# Patient Record
Sex: MALE | Race: WHITE | Employment: FULL TIME | URBAN - METROPOLITAN AREA
[De-identification: names, ages, dates, MRNs, and addresses within clinical notes are randomized per-mention and may not be internally consistent; named-entity substitution may affect disease eponyms.]

---

## 2021-11-11 ENCOUNTER — TRANSCRIBE ORDER (OUTPATIENT)
Dept: SCHEDULING | Age: 56
End: 2021-11-11

## 2021-11-11 DIAGNOSIS — M16.7: Primary | ICD-10-CM

## 2021-11-12 ENCOUNTER — TRANSCRIBE ORDER (OUTPATIENT)
Dept: SCHEDULING | Age: 56
End: 2021-11-12

## 2021-11-29 ENCOUNTER — TRANSCRIBE ORDER (OUTPATIENT)
Dept: REGISTRATION | Age: 56
End: 2021-11-29

## 2021-11-29 DIAGNOSIS — Z01.812 PRE-PROCEDURE LAB EXAM: Primary | ICD-10-CM

## 2021-12-03 ENCOUNTER — HOSPITAL ENCOUNTER (OUTPATIENT)
Dept: CT IMAGING | Age: 56
Discharge: HOME | End: 2021-12-03
Attending: ORTHOPAEDIC SURGERY
Payer: COMMERCIAL

## 2021-12-03 DIAGNOSIS — M16.7: ICD-10-CM

## 2021-12-03 PROCEDURE — 73700 CT LOWER EXTREMITY W/O DYE: CPT

## 2021-12-06 ENCOUNTER — HOSPITAL ENCOUNTER (OUTPATIENT)
Dept: PREADMISSION TESTING | Age: 56
Discharge: HOME | End: 2021-12-06
Payer: COMMERCIAL

## 2021-12-06 VITALS
HEIGHT: 72 IN | TEMPERATURE: 97.8 F | HEART RATE: 78 BPM | WEIGHT: 264.55 LBS | BODY MASS INDEX: 35.83 KG/M2 | OXYGEN SATURATION: 93 %

## 2021-12-06 LAB
ABO + RH BLD: NORMAL
APPEARANCE UR: CLEAR
BACTERIA URNS QL MICRO: NEGATIVE /HPF
BILIRUB UR QL: NEGATIVE
BLOOD GROUP ANTIBODIES SERPL: NORMAL
COLOR UR: NORMAL
EPITH CASTS URNS QL MICRO: NORMAL /LPF
GLUCOSE UR STRIP.AUTO-MCNC: NEGATIVE MG/DL
HGB UR QL STRIP: NEGATIVE
HYALINE CASTS URNS QL MICRO: NORMAL /LPF (ref 0–5)
KETONES UR QL STRIP.AUTO: NEGATIVE MG/DL
LEUKOCYTE ESTERASE UR QL STRIP.AUTO: NEGATIVE
NITRITE UR QL STRIP.AUTO: NEGATIVE
PH UR STRIP: 6 [PH] (ref 5–8)
PROT UR STRIP-MCNC: NEGATIVE MG/DL
RBC #/AREA URNS HPF: NORMAL /HPF (ref 0–5)
SP GR UR REFRACTOMETRY: 1.02 (ref 1–1.03)
SPECIMEN EXP DATE BLD: NORMAL
UA: UC IF INDICATED,UAUC: NORMAL
UROBILINOGEN UR QL STRIP.AUTO: 0.2 EU/DL (ref 0.2–1)
WBC URNS QL MICRO: NORMAL /HPF (ref 0–4)

## 2021-12-06 PROCEDURE — 81001 URINALYSIS AUTO W/SCOPE: CPT

## 2021-12-06 PROCEDURE — 86901 BLOOD TYPING SEROLOGIC RH(D): CPT

## 2021-12-06 RX ORDER — IBUPROFEN 200 MG
200 TABLET ORAL
COMMUNITY
End: 2021-12-17

## 2021-12-06 RX ORDER — METHOTREXATE 2.5 MG/1
2.5 TABLET ORAL
COMMUNITY

## 2021-12-06 RX ORDER — CETIRIZINE HYDROCHLORIDE 10 MG/1
10 CAPSULE, LIQUID FILLED ORAL DAILY
COMMUNITY

## 2021-12-06 RX ORDER — LEUCOVORIN CALCIUM 5 MG/1
TABLET ORAL
COMMUNITY
End: 2021-12-18 | Stop reason: DRUGHIGH

## 2021-12-06 RX ORDER — DEXTROAMPHETAMINE SACCHARATE, AMPHETAMINE ASPARTATE, DEXTROAMPHETAMINE SULFATE AND AMPHETAMINE SULFATE 5; 5; 5; 5 MG/1; MG/1; MG/1; MG/1
20 TABLET ORAL DAILY
COMMUNITY

## 2021-12-06 RX ORDER — ACETAMINOPHEN 325 MG/1
325 TABLET ORAL
COMMUNITY

## 2021-12-06 NOTE — PERIOP NOTES
Preoperative instructions reviewed with patient. Patient given two bottles of CHG soap. Instructions(reviewed/to be reviewed in class) on use of CHG soap. Patient given SSI infection FAQS sheet, as well as a  MRSA/MSSA treatment instruction sheet  With an explanation to patient that they will be notified if treatment instructions need to be initiated. Patient was given the opportunity to ask questions on the information provided. PROOF OF COVID VACCINE ON CHART. BP IN RIGHT ARM: 160/99 X2             168/110  BP IN LEFT ARM DONE MANUALLY: 158/99    PT HAD NO C/O OF HEADACHE, CHEST PAIN, OR SOB. Sondra De Jesus NP IN TO ASSESS AND INSTRUCT PT ABOUT CALLING PCP IF BP DOES NOT DECREASE ONCE AT HOME. PT DOES HAVE AN AUTOMATIC BP CUFF AND WILL CHECK BP AT HOME.

## 2021-12-06 NOTE — PERIOP NOTES
PAT Nurse Practitioner   Pre-Operative Chart Review/Assessment:-ORTHOPEDIC                Patient Name:  Soni Plascencia                                                           Age:   49 y.o.    :  1965     Today's Date:  2021     Date of PAT:   2021      Date of Surgery:    2021      Procedure(s):  Right Total Hip Arthroplasty FAST TRACK     Surgeon:   Dr. Philippe Mckay                       PLAN:      1)  Medical Clearance:  Dr. Yinka Brown      2)  Cardiac Clearance:  EKG from PCP on 21 and METs reviewed. No further cardiac evaluation requested. 3)  Diabetic Treatment Consult:  Not indicated. A1c-5.4      4)  Sleep Apnea evaluation:   VANDANA score of 6. Pt reports loud snoring that can be heard through a closed door and a diagnosis of HTN. Pt denies daytime fatigue and witnessed pauses in breathing. Pt refuses referral to sleep medicine at this time. 5) Treatment for MRSA/Staph Aureus:  Neg      6) Additional Concerns:  Former smoker, HTN, Psoriatic arthritis    Pt seen for elevated BP during PAT visit. /99. Other VSS. No c/o HA, CP, dizziness. Pt is on BP meds(see below) which he took this am. He does report that he is anxious and having some pain in his hip. He also stated that his BP was 120's/70's at his PCP office last week. He has a cuff at home and monitors his BP regularly. Pt instructed to recheck BP later today and if SBP>160 or DBP>100, call and notify his PCP. Also discussed S&S of stroke and heart attack and if he experiences any of these, seek emergency medical attention. Pt agrees to plan of care.               Vital Signs:         Vitals:    21 0819   Pulse: 78   Temp: 97.8 °F (36.6 °C)   SpO2: 93%   Weight: 120 kg (264 lb 8.8 oz)   Height: 6' (1.829 m)            ____________________________________________  PAST MEDICAL HISTORY  Past Medical History:   Diagnosis Date    High cholesterol     Hypertension     Psoriasis     Psoriatic arthritis ([facility]       ____________________________________________  PAST SURGICAL HISTORY  Past Surgical History:   Procedure Laterality Date    HX HERNIA REPAIR  2006    HX TONSILLECTOMY      HX TOTAL COLECTOMY  2001    HX VASECTOMY        ____________________________________________  HOME MEDICATIONS  Current Outpatient Medications   Medication Sig    methotrexate (RHEUMATREX) 2.5 mg tablet Take 2.5 mg by mouth every Wednesday. Indications: seven tablets every 7 days    dextroamphetamine-amphetamine (AdderalL) 20 mg tablet Take 20 mg by mouth.  Cetirizine (ZyrTEC) 10 mg cap Take 10 mg by mouth.  leucovorin calcium (WELLCOVORIN) 5 mg tablet Take  by mouth.  ibuprofen (AdviL) 200 mg tablet Take 200 mg by mouth.  acetaminophen (TylenoL) 325 mg tablet Take 325 mg by mouth every four (4) hours as needed for Pain.  hydrochlorothiazide (HYDRODIURIL) 25 mg tablet Take 25 mg by mouth daily.  LOSARTAN PO Take 100 mg by mouth daily.  adalimumab (HUMIRA PEN) 40 mg/0.8 mL PnKt 40 mg by SubCUTAneous route every fourteen (14) days.  SIMVASTATIN PO Take 1 Tab by mouth nightly. No current facility-administered medications for this encounter.      ____________________________________________  ALLERGIES  No Known Allergies   ____________________________________________  SOCIAL HISTORY  Social History     Tobacco Use    Smoking status: Former Smoker     Packs/day: 0.50     Years: 10.00     Pack years: 5.00    Smokeless tobacco: Current User   Substance Use Topics    Alcohol use:  Yes     Alcohol/week: 6.0 standard drinks     Types: 6 Cans of beer per week      ____________________________________________   Internal Administration   First Dose COVID-19, PFIZER, MRNA, LNP-S, PF, 30MCG/0.3ML DOSE  03/31/2021   Second Dose COVID-19, PFIZER, MRNA, LNP-S, PF, 30MCG/0.3ML DOSE  04/29/2021      Last COVID Lab SARS-CoV-2 ( )   Date Value   12/13/2021 Not detected        Labs:     Hospital Outpatient Visit on 12/06/2021   Component Date Value Ref Range Status    Crossmatch Expiration 12/06/2021 12/19/2021,2359   Final    ABO/Rh(D) 12/06/2021 O POSITIVE   Final    Antibody screen 12/06/2021 NEG   Final    Color 12/06/2021 YELLOW/STRAW    Final    Color Reference Range: Straw, Yellow or Dark Yellow    Appearance 12/06/2021 CLEAR  CLEAR   Final    Specific gravity 12/06/2021 1.018  1.003 - 1.030   Final    pH (UA) 12/06/2021 6.0  5.0 - 8.0   Final    Protein 12/06/2021 Negative  NEG mg/dL Final    Glucose 12/06/2021 Negative  NEG mg/dL Final    Ketone 12/06/2021 Negative  NEG mg/dL Final    Bilirubin 12/06/2021 Negative  NEG   Final    Blood 12/06/2021 Negative  NEG   Final    Urobilinogen 12/06/2021 0.2  0.2 - 1.0 EU/dL Final    Nitrites 12/06/2021 Negative  NEG   Final    Leukocyte Esterase 12/06/2021 Negative  NEG   Final    UA:UC IF INDICATED 12/06/2021 CULTURE NOT INDICATED BY UA RESULT    Final    WBC 12/06/2021 0-4  0 - 4 /hpf Final    RBC 12/06/2021 0-5  0 - 5 /hpf Final    Epithelial cells 12/06/2021 FEW  FEW /lpf Final    Epithelial cell category consists of squamous cells and /or transitional urothelial cells. Renal tubular cells, if present, are separately identified as such.  Bacteria 12/06/2021 Negative  NEG /hpf Final    Hyaline cast 12/06/2021 0-2  0 - 5 /lpf Final    Special Requests: 12/06/2021 NO SPECIAL REQUESTS    Final    Culture result: 12/06/2021 MRSA NOT PRESENT    Final       Skin:     Denies open wounds, cuts, sores, rashes or other areas of concern in PAT assessment.           Jayshree Mckeon NP

## 2021-12-07 LAB
BACTERIA SPEC CULT: NORMAL
BACTERIA SPEC CULT: NORMAL
SERVICE CMNT-IMP: NORMAL

## 2021-12-13 ENCOUNTER — HOSPITAL ENCOUNTER (OUTPATIENT)
Dept: PREADMISSION TESTING | Age: 56
Discharge: HOME | End: 2021-12-13
Attending: ORTHOPAEDIC SURGERY
Payer: COMMERCIAL

## 2021-12-13 DIAGNOSIS — Z01.812 PRE-PROCEDURE LAB EXAM: ICD-10-CM

## 2021-12-13 PROCEDURE — U0005 INFEC AGEN DETEC AMPLI PROBE: HCPCS

## 2021-12-14 LAB
SARS-COV-2, XPLCVT: NOT DETECTED
SOURCE, COVRS: NORMAL

## 2021-12-15 ENCOUNTER — ANESTHESIA EVENT (OUTPATIENT)
Dept: SURGERY | Age: 56
End: 2021-12-15
Payer: COMMERCIAL

## 2021-12-16 ENCOUNTER — APPOINTMENT (OUTPATIENT)
Dept: GENERAL RADIOLOGY | Age: 56
End: 2021-12-16
Attending: ORTHOPAEDIC SURGERY
Payer: COMMERCIAL

## 2021-12-16 ENCOUNTER — HOSPITAL ENCOUNTER (OUTPATIENT)
Age: 56
Setting detail: OBSERVATION
Discharge: HOME | End: 2021-12-17
Attending: ORTHOPAEDIC SURGERY | Admitting: ORTHOPAEDIC SURGERY
Payer: COMMERCIAL

## 2021-12-16 ENCOUNTER — ANESTHESIA (OUTPATIENT)
Dept: SURGERY | Age: 56
End: 2021-12-16
Payer: COMMERCIAL

## 2021-12-16 DIAGNOSIS — Z96.641 STATUS POST TOTAL HIP REPLACEMENT, RIGHT: ICD-10-CM

## 2021-12-16 DIAGNOSIS — M16.11 PRIMARY OSTEOARTHRITIS OF RIGHT HIP: Primary | ICD-10-CM

## 2021-12-16 LAB
GLUCOSE BLD STRIP.AUTO-MCNC: 107 MG/DL (ref 65–117)
SERVICE CMNT-IMP: NORMAL

## 2021-12-16 PROCEDURE — 77030040361 HC SLV COMPR DVT MDII -B: Performed by: ORTHOPAEDIC SURGERY

## 2021-12-16 PROCEDURE — 77030026438 HC STYL ET INTUB CARD -A: Performed by: ANESTHESIOLOGY

## 2021-12-16 PROCEDURE — 73501 X-RAY EXAM HIP UNI 1 VIEW: CPT

## 2021-12-16 PROCEDURE — 82962 GLUCOSE BLOOD TEST: CPT

## 2021-12-16 PROCEDURE — 76210000016 HC OR PH I REC 1 TO 1.5 HR: Performed by: ORTHOPAEDIC SURGERY

## 2021-12-16 PROCEDURE — 2709999900 HC NON-CHARGEABLE SUPPLY: Performed by: ORTHOPAEDIC SURGERY

## 2021-12-16 PROCEDURE — 77030003666 HC NDL SPINAL BD -A: Performed by: ORTHOPAEDIC SURGERY

## 2021-12-16 PROCEDURE — 77030013079 HC BLNKT BAIR HGGR 3M -A: Performed by: ANESTHESIOLOGY

## 2021-12-16 PROCEDURE — 74011000250 HC RX REV CODE- 250: Performed by: NURSE ANESTHETIST, CERTIFIED REGISTERED

## 2021-12-16 PROCEDURE — 77030008684 HC TU ET CUF COVD -B: Performed by: ANESTHESIOLOGY

## 2021-12-16 PROCEDURE — 74011250636 HC RX REV CODE- 250/636: Performed by: NURSE ANESTHETIST, CERTIFIED REGISTERED

## 2021-12-16 PROCEDURE — 77030018547 HC SUT ETHBND1 J&J -B: Performed by: ORTHOPAEDIC SURGERY

## 2021-12-16 PROCEDURE — G0378 HOSPITAL OBSERVATION PER HR: HCPCS

## 2021-12-16 PROCEDURE — 77030020788: Performed by: ORTHOPAEDIC SURGERY

## 2021-12-16 PROCEDURE — 74011250636 HC RX REV CODE- 250/636: Performed by: ORTHOPAEDIC SURGERY

## 2021-12-16 PROCEDURE — C1776 JOINT DEVICE (IMPLANTABLE): HCPCS | Performed by: ORTHOPAEDIC SURGERY

## 2021-12-16 PROCEDURE — 74011250636 HC RX REV CODE- 250/636: Performed by: ANESTHESIOLOGY

## 2021-12-16 PROCEDURE — 77030006835 HC BLD SAW SAG STRY -B: Performed by: ORTHOPAEDIC SURGERY

## 2021-12-16 PROCEDURE — 77030035236 HC SUT PDS STRATFX BARB J&J -B: Performed by: ORTHOPAEDIC SURGERY

## 2021-12-16 PROCEDURE — 76060000039 HC ANESTHESIA 4 TO 4.5 HR: Performed by: ORTHOPAEDIC SURGERY

## 2021-12-16 PROCEDURE — 77030040922 HC BLNKT HYPOTHRM STRY -A

## 2021-12-16 PROCEDURE — 77030031139 HC SUT VCRL2 J&J -A: Performed by: ORTHOPAEDIC SURGERY

## 2021-12-16 PROCEDURE — 74011250637 HC RX REV CODE- 250/637: Performed by: ORTHOPAEDIC SURGERY

## 2021-12-16 PROCEDURE — 74011000250 HC RX REV CODE- 250: Performed by: ORTHOPAEDIC SURGERY

## 2021-12-16 PROCEDURE — 76010000173 HC OR TIME 3 TO 3.5 HR INTENSV-TIER 1: Performed by: ORTHOPAEDIC SURGERY

## 2021-12-16 DEVICE — CERAMIC V40 FEMORAL HEAD
Type: IMPLANTABLE DEVICE | Site: HIP | Status: FUNCTIONAL
Brand: BIOLOX

## 2021-12-16 DEVICE — 132 DEGREE NECK ANGLE HIP STEM
Type: IMPLANTABLE DEVICE | Site: HIP | Status: FUNCTIONAL
Brand: ACCOLADE

## 2021-12-16 DEVICE — TRIDENT II TRITANIUM CLUSTER 56F
Type: IMPLANTABLE DEVICE | Site: HIP | Status: FUNCTIONAL
Brand: TRIDENT II

## 2021-12-16 DEVICE — HIP H2 TOT ADV OTHER HD -- IMPL CAPPED H2: Type: IMPLANTABLE DEVICE | Site: HIP | Status: FUNCTIONAL

## 2021-12-16 DEVICE — TRIDENT X3 0 DEGREE POLYETHYLENE INSERT
Type: IMPLANTABLE DEVICE | Site: HIP | Status: FUNCTIONAL
Brand: TRIDENT X3 INSERT

## 2021-12-16 RX ORDER — SODIUM CHLORIDE 0.9 % (FLUSH) 0.9 %
5-40 SYRINGE (ML) INJECTION AS NEEDED
Status: DISCONTINUED | OUTPATIENT
Start: 2021-12-16 | End: 2021-12-16 | Stop reason: HOSPADM

## 2021-12-16 RX ORDER — ALPRAZOLAM 0.5 MG/1
0.5 TABLET ORAL
Status: DISCONTINUED | OUTPATIENT
Start: 2021-12-16 | End: 2021-12-17 | Stop reason: HOSPADM

## 2021-12-16 RX ORDER — SUCCINYLCHOLINE CHLORIDE 20 MG/ML
INJECTION INTRAMUSCULAR; INTRAVENOUS AS NEEDED
Status: DISCONTINUED | OUTPATIENT
Start: 2021-12-16 | End: 2021-12-16 | Stop reason: HOSPADM

## 2021-12-16 RX ORDER — HYDROMORPHONE HYDROCHLORIDE 2 MG/ML
INJECTION, SOLUTION INTRAMUSCULAR; INTRAVENOUS; SUBCUTANEOUS AS NEEDED
Status: DISCONTINUED | OUTPATIENT
Start: 2021-12-16 | End: 2021-12-16 | Stop reason: HOSPADM

## 2021-12-16 RX ORDER — FENTANYL CITRATE 50 UG/ML
INJECTION, SOLUTION INTRAMUSCULAR; INTRAVENOUS AS NEEDED
Status: DISCONTINUED | OUTPATIENT
Start: 2021-12-16 | End: 2021-12-16 | Stop reason: HOSPADM

## 2021-12-16 RX ORDER — ASPIRIN 81 MG/1
81 TABLET ORAL 2 TIMES DAILY
Status: DISCONTINUED | OUTPATIENT
Start: 2021-12-16 | End: 2021-12-17 | Stop reason: HOSPADM

## 2021-12-16 RX ORDER — SODIUM CHLORIDE 9 MG/ML
125 INJECTION, SOLUTION INTRAVENOUS CONTINUOUS
Status: DISPENSED | OUTPATIENT
Start: 2021-12-16 | End: 2021-12-17

## 2021-12-16 RX ORDER — DIPHENHYDRAMINE HCL 25 MG
25 CAPSULE ORAL
Status: DISCONTINUED | OUTPATIENT
Start: 2021-12-16 | End: 2021-12-17 | Stop reason: HOSPADM

## 2021-12-16 RX ORDER — LIDOCAINE HYDROCHLORIDE 10 MG/ML
0.1 INJECTION, SOLUTION EPIDURAL; INFILTRATION; INTRACAUDAL; PERINEURAL AS NEEDED
Status: DISCONTINUED | OUTPATIENT
Start: 2021-12-16 | End: 2021-12-16 | Stop reason: HOSPADM

## 2021-12-16 RX ORDER — KETOROLAC TROMETHAMINE 30 MG/ML
15 INJECTION, SOLUTION INTRAMUSCULAR; INTRAVENOUS EVERY 6 HOURS
Status: DISCONTINUED | OUTPATIENT
Start: 2021-12-16 | End: 2021-12-16

## 2021-12-16 RX ORDER — FENTANYL CITRATE 50 UG/ML
25 INJECTION, SOLUTION INTRAMUSCULAR; INTRAVENOUS
Status: DISCONTINUED | OUTPATIENT
Start: 2021-12-16 | End: 2021-12-16 | Stop reason: HOSPADM

## 2021-12-16 RX ORDER — PROPOFOL 10 MG/ML
INJECTION, EMULSION INTRAVENOUS AS NEEDED
Status: DISCONTINUED | OUTPATIENT
Start: 2021-12-16 | End: 2021-12-16 | Stop reason: HOSPADM

## 2021-12-16 RX ORDER — SODIUM CHLORIDE 0.9 % (FLUSH) 0.9 %
5-40 SYRINGE (ML) INJECTION EVERY 8 HOURS
Status: DISCONTINUED | OUTPATIENT
Start: 2021-12-16 | End: 2021-12-17 | Stop reason: HOSPADM

## 2021-12-16 RX ORDER — ONDANSETRON 2 MG/ML
4 INJECTION INTRAMUSCULAR; INTRAVENOUS AS NEEDED
Status: DISCONTINUED | OUTPATIENT
Start: 2021-12-16 | End: 2021-12-16 | Stop reason: HOSPADM

## 2021-12-16 RX ORDER — HYDROCHLOROTHIAZIDE 25 MG/1
25 TABLET ORAL DAILY
Status: DISCONTINUED | OUTPATIENT
Start: 2021-12-17 | End: 2021-12-17 | Stop reason: HOSPADM

## 2021-12-16 RX ORDER — MIDAZOLAM HYDROCHLORIDE 1 MG/ML
INJECTION, SOLUTION INTRAMUSCULAR; INTRAVENOUS AS NEEDED
Status: DISCONTINUED | OUTPATIENT
Start: 2021-12-16 | End: 2021-12-16 | Stop reason: HOSPADM

## 2021-12-16 RX ORDER — DEXAMETHASONE SODIUM PHOSPHATE 4 MG/ML
INJECTION, SOLUTION INTRA-ARTICULAR; INTRALESIONAL; INTRAMUSCULAR; INTRAVENOUS; SOFT TISSUE AS NEEDED
Status: DISCONTINUED | OUTPATIENT
Start: 2021-12-16 | End: 2021-12-16 | Stop reason: HOSPADM

## 2021-12-16 RX ORDER — KETOROLAC TROMETHAMINE 30 MG/ML
15 INJECTION, SOLUTION INTRAMUSCULAR; INTRAVENOUS EVERY 6 HOURS
Status: COMPLETED | OUTPATIENT
Start: 2021-12-16 | End: 2021-12-17

## 2021-12-16 RX ORDER — SODIUM CHLORIDE 0.9 % (FLUSH) 0.9 %
5-40 SYRINGE (ML) INJECTION AS NEEDED
Status: DISCONTINUED | OUTPATIENT
Start: 2021-12-16 | End: 2021-12-17 | Stop reason: HOSPADM

## 2021-12-16 RX ORDER — METHOTREXATE 2.5 MG/1
2.5 TABLET ORAL
Status: DISCONTINUED | OUTPATIENT
Start: 2021-12-22 | End: 2021-12-17 | Stop reason: HOSPADM

## 2021-12-16 RX ORDER — SODIUM CHLORIDE 0.9 % (FLUSH) 0.9 %
5-40 SYRINGE (ML) INJECTION EVERY 8 HOURS
Status: DISCONTINUED | OUTPATIENT
Start: 2021-12-16 | End: 2021-12-16 | Stop reason: HOSPADM

## 2021-12-16 RX ORDER — SODIUM CHLORIDE, SODIUM LACTATE, POTASSIUM CHLORIDE, CALCIUM CHLORIDE 600; 310; 30; 20 MG/100ML; MG/100ML; MG/100ML; MG/100ML
50 INJECTION, SOLUTION INTRAVENOUS CONTINUOUS
Status: DISCONTINUED | OUTPATIENT
Start: 2021-12-16 | End: 2021-12-16 | Stop reason: HOSPADM

## 2021-12-16 RX ORDER — ROCURONIUM BROMIDE 10 MG/ML
INJECTION, SOLUTION INTRAVENOUS AS NEEDED
Status: DISCONTINUED | OUTPATIENT
Start: 2021-12-16 | End: 2021-12-16 | Stop reason: HOSPADM

## 2021-12-16 RX ORDER — HYDROMORPHONE HYDROCHLORIDE 1 MG/ML
0.2 INJECTION, SOLUTION INTRAMUSCULAR; INTRAVENOUS; SUBCUTANEOUS
Status: DISCONTINUED | OUTPATIENT
Start: 2021-12-16 | End: 2021-12-16 | Stop reason: HOSPADM

## 2021-12-16 RX ORDER — AMOXICILLIN 250 MG
1 CAPSULE ORAL 2 TIMES DAILY
Status: DISCONTINUED | OUTPATIENT
Start: 2021-12-16 | End: 2021-12-17 | Stop reason: HOSPADM

## 2021-12-16 RX ORDER — LOSARTAN POTASSIUM 50 MG/1
100 TABLET ORAL DAILY
Status: DISCONTINUED | OUTPATIENT
Start: 2021-12-17 | End: 2021-12-17 | Stop reason: HOSPADM

## 2021-12-16 RX ORDER — ONDANSETRON 2 MG/ML
INJECTION INTRAMUSCULAR; INTRAVENOUS AS NEEDED
Status: DISCONTINUED | OUTPATIENT
Start: 2021-12-16 | End: 2021-12-16 | Stop reason: HOSPADM

## 2021-12-16 RX ORDER — OXYCODONE HYDROCHLORIDE 5 MG/1
5 TABLET ORAL
Status: DISCONTINUED | OUTPATIENT
Start: 2021-12-16 | End: 2021-12-17 | Stop reason: HOSPADM

## 2021-12-16 RX ORDER — OXYCODONE HYDROCHLORIDE 5 MG/1
10 TABLET ORAL
Status: DISCONTINUED | OUTPATIENT
Start: 2021-12-16 | End: 2021-12-17 | Stop reason: HOSPADM

## 2021-12-16 RX ORDER — TRANEXAMIC ACID 100 MG/ML
INJECTION, SOLUTION INTRAVENOUS AS NEEDED
Status: DISCONTINUED | OUTPATIENT
Start: 2021-12-16 | End: 2021-12-16 | Stop reason: HOSPADM

## 2021-12-16 RX ORDER — ACETAMINOPHEN 325 MG/1
650 TABLET ORAL
Status: DISCONTINUED | OUTPATIENT
Start: 2021-12-16 | End: 2021-12-17 | Stop reason: HOSPADM

## 2021-12-16 RX ORDER — FACIAL-BODY WIPES
10 EACH TOPICAL DAILY PRN
Status: DISCONTINUED | OUTPATIENT
Start: 2021-12-18 | End: 2021-12-17 | Stop reason: HOSPADM

## 2021-12-16 RX ORDER — DEXTROAMPHETAMINE SACCHARATE, AMPHETAMINE ASPARTATE, DEXTROAMPHETAMINE SULFATE AND AMPHETAMINE SULFATE 2.5; 2.5; 2.5; 2.5 MG/1; MG/1; MG/1; MG/1
20 TABLET ORAL DAILY
Status: DISCONTINUED | OUTPATIENT
Start: 2021-12-17 | End: 2021-12-17 | Stop reason: HOSPADM

## 2021-12-16 RX ORDER — CELECOXIB 200 MG/1
200 CAPSULE ORAL 2 TIMES DAILY
Status: DISCONTINUED | OUTPATIENT
Start: 2021-12-17 | End: 2021-12-17 | Stop reason: HOSPADM

## 2021-12-16 RX ORDER — PHENYLEPHRINE HCL IN 0.9% NACL 0.4MG/10ML
SYRINGE (ML) INTRAVENOUS AS NEEDED
Status: DISCONTINUED | OUTPATIENT
Start: 2021-12-16 | End: 2021-12-16 | Stop reason: HOSPADM

## 2021-12-16 RX ORDER — POLYETHYLENE GLYCOL 3350 17 G/17G
17 POWDER, FOR SOLUTION ORAL DAILY
Status: DISCONTINUED | OUTPATIENT
Start: 2021-12-17 | End: 2021-12-17 | Stop reason: HOSPADM

## 2021-12-16 RX ORDER — LIDOCAINE HYDROCHLORIDE 20 MG/ML
INJECTION, SOLUTION EPIDURAL; INFILTRATION; INTRACAUDAL; PERINEURAL AS NEEDED
Status: DISCONTINUED | OUTPATIENT
Start: 2021-12-16 | End: 2021-12-16 | Stop reason: HOSPADM

## 2021-12-16 RX ORDER — FAMOTIDINE 20 MG/1
20 TABLET, FILM COATED ORAL 2 TIMES DAILY
Status: DISCONTINUED | OUTPATIENT
Start: 2021-12-16 | End: 2021-12-17 | Stop reason: HOSPADM

## 2021-12-16 RX ORDER — MORPHINE SULFATE 2 MG/ML
2 INJECTION, SOLUTION INTRAMUSCULAR; INTRAVENOUS
Status: DISCONTINUED | OUTPATIENT
Start: 2021-12-16 | End: 2021-12-16 | Stop reason: HOSPADM

## 2021-12-16 RX ORDER — NALOXONE HYDROCHLORIDE 0.4 MG/ML
0.4 INJECTION, SOLUTION INTRAMUSCULAR; INTRAVENOUS; SUBCUTANEOUS AS NEEDED
Status: DISCONTINUED | OUTPATIENT
Start: 2021-12-16 | End: 2021-12-17 | Stop reason: HOSPADM

## 2021-12-16 RX ADMIN — DIPHENHYDRAMINE HYDROCHLORIDE 25 MG: 25 CAPSULE ORAL at 23:08

## 2021-12-16 RX ADMIN — DEXAMETHASONE SODIUM PHOSPHATE 4 MG: 4 INJECTION, SOLUTION INTRAMUSCULAR; INTRAVENOUS at 12:54

## 2021-12-16 RX ADMIN — CEFAZOLIN SODIUM 2 G: 1 INJECTION, POWDER, FOR SOLUTION INTRAMUSCULAR; INTRAVENOUS at 19:52

## 2021-12-16 RX ADMIN — ROCURONIUM BROMIDE 10 MG: 10 SOLUTION INTRAVENOUS at 13:40

## 2021-12-16 RX ADMIN — MIDAZOLAM 2 MG: 1 INJECTION INTRAMUSCULAR; INTRAVENOUS at 11:20

## 2021-12-16 RX ADMIN — DOCUSATE SODIUM 50 MG AND SENNOSIDES 8.6 MG 1 TABLET: 8.6; 5 TABLET, FILM COATED ORAL at 19:52

## 2021-12-16 RX ADMIN — ASPIRIN 81 MG: 81 TABLET, COATED ORAL at 19:52

## 2021-12-16 RX ADMIN — OXYCODONE 5 MG: 5 TABLET ORAL at 17:54

## 2021-12-16 RX ADMIN — SODIUM CHLORIDE 125 ML/HR: 900 INJECTION, SOLUTION INTRAVENOUS at 16:51

## 2021-12-16 RX ADMIN — HYDROMORPHONE HYDROCHLORIDE 0.5 MG: 2 INJECTION, SOLUTION INTRAMUSCULAR; INTRAVENOUS; SUBCUTANEOUS at 13:18

## 2021-12-16 RX ADMIN — TRANEXAMIC ACID 1 G: 100 INJECTION, SOLUTION INTRAVENOUS at 13:15

## 2021-12-16 RX ADMIN — FENTANYL CITRATE 25 MCG: 0.05 INJECTION, SOLUTION INTRAMUSCULAR; INTRAVENOUS at 16:25

## 2021-12-16 RX ADMIN — Medication 15 MG: at 19:52

## 2021-12-16 RX ADMIN — FAMOTIDINE 20 MG: 20 TABLET ORAL at 19:52

## 2021-12-16 RX ADMIN — FENTANYL CITRATE 50 MCG: 50 INJECTION, SOLUTION INTRAMUSCULAR; INTRAVENOUS at 12:40

## 2021-12-16 RX ADMIN — FENTANYL CITRATE 25 MCG: 0.05 INJECTION, SOLUTION INTRAMUSCULAR; INTRAVENOUS at 16:30

## 2021-12-16 RX ADMIN — FENTANYL CITRATE 50 MCG: 50 INJECTION, SOLUTION INTRAMUSCULAR; INTRAVENOUS at 14:32

## 2021-12-16 RX ADMIN — FENTANYL CITRATE 25 MCG: 0.05 INJECTION, SOLUTION INTRAMUSCULAR; INTRAVENOUS at 16:39

## 2021-12-16 RX ADMIN — LIDOCAINE HYDROCHLORIDE 80 MG: 20 INJECTION, SOLUTION EPIDURAL; INFILTRATION; INTRACAUDAL; PERINEURAL at 12:21

## 2021-12-16 RX ADMIN — TRANEXAMIC ACID 1 G: 100 INJECTION, SOLUTION INTRAVENOUS at 14:55

## 2021-12-16 RX ADMIN — ROCURONIUM BROMIDE 20 MG: 10 SOLUTION INTRAVENOUS at 13:10

## 2021-12-16 RX ADMIN — DIPHENHYDRAMINE HYDROCHLORIDE 25 MG: 25 CAPSULE ORAL at 19:18

## 2021-12-16 RX ADMIN — WATER 2 G: 1 INJECTION INTRAMUSCULAR; INTRAVENOUS; SUBCUTANEOUS at 12:51

## 2021-12-16 RX ADMIN — Medication 80 MCG: at 13:22

## 2021-12-16 RX ADMIN — ACETAMINOPHEN 650 MG: 325 TABLET ORAL at 17:54

## 2021-12-16 RX ADMIN — FENTANYL CITRATE 50 MCG: 50 INJECTION, SOLUTION INTRAMUSCULAR; INTRAVENOUS at 12:21

## 2021-12-16 RX ADMIN — Medication 10 ML: at 22:00

## 2021-12-16 RX ADMIN — ROCURONIUM BROMIDE 10 MG: 10 SOLUTION INTRAVENOUS at 14:00

## 2021-12-16 RX ADMIN — OXYCODONE 5 MG: 5 TABLET ORAL at 20:56

## 2021-12-16 RX ADMIN — ONDANSETRON HYDROCHLORIDE 4 MG: 2 INJECTION, SOLUTION INTRAMUSCULAR; INTRAVENOUS at 14:58

## 2021-12-16 RX ADMIN — SODIUM CHLORIDE, POTASSIUM CHLORIDE, SODIUM LACTATE AND CALCIUM CHLORIDE: 600; 310; 30; 20 INJECTION, SOLUTION INTRAVENOUS at 15:13

## 2021-12-16 RX ADMIN — SUCCINYLCHOLINE CHLORIDE 120 MG: 20 INJECTION, SOLUTION INTRAMUSCULAR; INTRAVENOUS at 12:21

## 2021-12-16 RX ADMIN — ROCURONIUM BROMIDE 25 MG: 10 SOLUTION INTRAVENOUS at 12:32

## 2021-12-16 RX ADMIN — ROCURONIUM BROMIDE 5 MG: 10 SOLUTION INTRAVENOUS at 12:21

## 2021-12-16 RX ADMIN — SODIUM CHLORIDE, POTASSIUM CHLORIDE, SODIUM LACTATE AND CALCIUM CHLORIDE 50 ML/HR: 600; 310; 30; 20 INJECTION, SOLUTION INTRAVENOUS at 09:30

## 2021-12-16 RX ADMIN — SODIUM CHLORIDE, POTASSIUM CHLORIDE, SODIUM LACTATE AND CALCIUM CHLORIDE: 600; 310; 30; 20 INJECTION, SOLUTION INTRAVENOUS at 15:14

## 2021-12-16 RX ADMIN — PROPOFOL 180 MG: 10 INJECTION, EMULSION INTRAVENOUS at 12:21

## 2021-12-16 RX ADMIN — HYDROMORPHONE HYDROCHLORIDE 0.5 MG: 2 INJECTION, SOLUTION INTRAMUSCULAR; INTRAVENOUS; SUBCUTANEOUS at 14:13

## 2021-12-16 NOTE — PROGRESS NOTES
Chart reviewed and noted patient in OR at this time for R ELIE. Will follow up for OT as able. Thank you.

## 2021-12-16 NOTE — H&P
Nora Burris MD - 11/09/2021 11:10 AM EST  Formatting of this note is different from the original.  Images from the original note were not included. CARE TEAM:  Patient Care Team:  Ann Orozco MD as PCP - General (Internal Medicine)    ASSESSMENT  Diagnosis Plan   1. Other secondary osteoarthritis of right hip   2. Psoriatic arthritis     There is no problem list on file for this patient. PLAN  Treatment Plan:   -we discussed non operative and operative treatment options. He wants to pursue long-term improvement of his hip  -we talked about right total hip arthroplasty by direct anterior approach. We talked about the risks, benefits, complications, convalescence regarding this treatment.  -I recommended he discontinue the Humira treatments 1 month prior to surgery. He can continue methotrexate throughout the perioperative.  -I requested a surgical clearance from his primary care physician and rheumatologist  -he will be contacted to schedule pre-admission testing at [facility] and preoperative CT scan for computer-assisted joint replacement  -we discussed the advantages and disadvantages of Anderson robotic assistance surgery. We talked about the CT scan required. He agrees to proceed forth  -surgical date planned 12/16/21 at [facility] This Encounter    BMI >=25 PATIENT INSTRUCTIONS & EDUCATION    BP Elevated Patient Education & Instructions     Return for Post-Op Check. HISTORY OF PRESENT ILLNESS  Chief Complaint: Pain of the Right Hip    Age: 49 y.o. Sex: male   Hand-dominance: left  History of present illness: Mr. Joaquin Wilkins presents today for evaluation of right hip pain. He has a chronic history of right hip pain going back several years. Was initially evaluated by us in January this year was referred for an intra-articular injection. He has subsequently had 2 injections, the most recent being about 6 weeks ago.  He has a history of psoriatic arthritis and is on Humira and methotrexate. He has progressive difficulty walking, bearing weight, dressing himself and putting on socks and shoes. He wants to consider longer-term relief. Pain rating = 8 out of 10     OBJECTIVE  Constitutional: No acute distress. His body mass index is 34.17 kg/m². Eyes: Sclera are nonicteric. Respiratory: No labored breathing. Cardiovascular: No marked edema. Skin: No marked skin ulcers. Neurological: No marked sensory loss noted. Psychiatric: Alert and oriented x3. Inspection:  Gait: trendelenburg. Ambulatory aid: None    Left Hip Exam:    ROM:  Flexion: 120  Extension: 30  Internal Rotation: 30  External Rotation: 45    Right Hip Exam:    ROM:  Flexion: 110  Extension: 20  Internal Rotation: 20  External Rotation: 45    SLR: Positive  Pain: with IR  Tenderness to palpation: None    Strength:  Hip Flexor: 5/5  Quad: 5/5  Anterior Tibialis: 5/5  Extensor Hallucis Longus: 5/5    Neurovascular:  Pulses: Intact  Sensation: Grossly Intact  Edema: None    IMAGING / STUDIES    I have independently reviewed our previous diagnostic imaging. Two view x-rays of the right hip taken 2021 show severe degenerative arthritis with near complete joint space loss of the right. There are mild findings of arthritis with subchondral sclerosis on the left, but the joint space is maintained. Comparing to previous imaging in January, there has been only slight progression of the severe arthritis.     PROCEDURES  Procedures    Cindi Molina MD      Electronically signed by Cindi Molina MD at 2021 9:22 PM EST      JAYLON Nurse Practitioner   Pre-Operative Chart Review/Assessment:-ORTHOPEDIC                   Patient Name:  Mark Acevedo                                                           Age:   49 y.o.    :  1965      Today's Date:  2021      Date of PAT:   2021       Date of Surgery:    2021       Procedure(s):  Right Total Hip Arthroplasty FAST TRACK    Surgeon:   Dr. Radha Skinner                              PLAN:       1)  Medical Clearance:  Dr. Kyle Justin       2)  Cardiac Clearance:  EKG from PCP on 11/23/21 and METs reviewed. No further cardiac evaluation requested. 3)  Diabetic Treatment Consult:  Not indicated. A1c-5.4       4)  Sleep Apnea evaluation:   VANDANA score of 6. Pt reports loud snoring that can be heard through a closed door and a diagnosis of HTN. Pt denies daytime fatigue and witnessed pauses in breathing. Pt refuses referral to sleep medicine at this time.      5) Treatment for MRSA/Staph Aureus:  Neg       6) Additional Concerns:  Former smoker, HTN, Psoriatic arthritis     Pt seen for elevated BP during PAT visit. /99. Other VSS. No c/o HA, CP, dizziness. Pt is on BP meds(see below) which he took this am. He does report that he is anxious and having some pain in his hip. He also stated that his BP was 120's/70's at his PCP office last week. He has a cuff at home and monitors his BP regularly. Pt instructed to recheck BP later today and if SBP>160 or DBP>100, call and notify his PCP. Also discussed S&S of stroke and heart attack and if he experiences any of these, seek emergency medical attention.  Pt agrees to plan of care.                 Vital Signs:              Vitals:     12/06/21 0819   Pulse: 78   Temp: 97.8 °F (36.6 °C)   SpO2: 93%   Weight: 120 kg (264 lb 8.8 oz)   Height: 6' (1.829 m)                ____________________________________________  PAST MEDICAL HISTORY       Past Medical History:   Diagnosis Date    High cholesterol      Hypertension      Psoriasis      Psoriatic arthritis ([facility] [address]        ____________________________________________  PAST SURGICAL HISTORY        Past Surgical History:   Procedure Laterality Date    HX HERNIA REPAIR   2006    HX TONSILLECTOMY        HX TOTAL COLECTOMY   2001    HX VASECTOMY          ____________________________________________  HOME MEDICATIONS       Current Outpatient Medications   Medication Sig    methotrexate (RHEUMATREX) 2.5 mg tablet Take 2.5 mg by mouth every Wednesday. Indications: seven tablets every 7 days    dextroamphetamine-amphetamine (AdderalL) 20 mg tablet Take 20 mg by mouth.  Cetirizine (ZyrTEC) 10 mg cap Take 10 mg by mouth.  leucovorin calcium (WELLCOVORIN) 5 mg tablet Take  by mouth.  ibuprofen (AdviL) 200 mg tablet Take 200 mg by mouth.  acetaminophen (TylenoL) 325 mg tablet Take 325 mg by mouth every four (4) hours as needed for Pain.  hydrochlorothiazide (HYDRODIURIL) 25 mg tablet Take 25 mg by mouth daily.  LOSARTAN PO Take 100 mg by mouth daily.  adalimumab (HUMIRA PEN) 40 mg/0.8 mL PnKt 40 mg by SubCUTAneous route every fourteen (14) days.  SIMVASTATIN PO Take 1 Tab by mouth nightly.        No current facility-administered medications for this encounter.      ____________________________________________  ALLERGIES  No Known Allergies   ____________________________________________  SOCIAL HISTORY  Social History            Tobacco Use    Smoking status: Former Smoker       Packs/day: 0.50       Years: 10.00       Pack years: 5.00    Smokeless tobacco: Current User   Substance Use Topics    Alcohol use:  Yes       Alcohol/week: 6.0 standard drinks       Types: 6 Cans of beer per week      ____________________________________________    Internal Administration   First Dose COVID-19, PFIZER, MRNA, LNP-S, PF, 30MCG/0.3ML DOSE  03/31/2021   Second Dose COVID-19, PFIZER, MRNA, LNP-S, PF, 30MCG/0.3ML DOSE  04/29/2021       Last COVID Lab     SARS-CoV-2 ( )   Date Value   12/13/2021 Not detected          Labs:   [facility] Outpatient Visit on 12/06/2021   Component Date Value Ref Range Status    Crossmatch Expiration 12/06/2021 12/19/2021,2359    Final    ABO/Rh(D) 12/06/2021 O POSITIVE    Final    Antibody screen 12/06/2021 NEG    Final    Color 12/06/2021 YELLOW/STRAW    Final     Color Reference Range: Straw, Yellow or Dark Yellow    Appearance 12/06/2021 CLEAR  CLEAR   Final    Specific gravity 12/06/2021 1.018  1.003 - 1.030   Final    pH (UA) 12/06/2021 6.0  5.0 - 8.0   Final    Protein 12/06/2021 Negative  NEG mg/dL Final    Glucose 12/06/2021 Negative  NEG mg/dL Final    Ketone 12/06/2021 Negative  NEG mg/dL Final    Bilirubin 12/06/2021 Negative  NEG   Final    Blood 12/06/2021 Negative  NEG   Final    Urobilinogen 12/06/2021 0.2  0.2 - 1.0 EU/dL Final    Nitrites 12/06/2021 Negative  NEG   Final    Leukocyte Esterase 12/06/2021 Negative  NEG   Final    UA:UC IF INDICATED 12/06/2021 CULTURE NOT INDICATED BY UA RESULT    Final    WBC 12/06/2021 0-4  0 - 4 /hpf Final    RBC 12/06/2021 0-5  0 - 5 /hpf Final    Epithelial cells 12/06/2021 FEW  FEW /lpf Final     Epithelial cell category consists of squamous cells and /or transitional urothelial cells. Renal tubular cells, if present, are separately identified as such.     Bacteria 12/06/2021 Negative  NEG /hpf Final    Hyaline cast 12/06/2021 0-2  0 - 5 /lpf Final    Special Requests: 12/06/2021 NO SPECIAL REQUESTS    Final    Culture result: 12/06/2021 MRSA NOT PRESENT    Final         Skin:      Denies open wounds, cuts, sores, rashes or other areas of concern in PAT assessment.             Shirley Petersen, NP

## 2021-12-16 NOTE — OP NOTES
Operative Report    Patient Name: Destiney Garcia    Patient YOB: 1965    [facility] MRN: [id number]    Date of Procedure: 12/16/21    Surgical Location: [facility]    Pre-operative Diagnosis: Right hip ostearthritis    Post-operative Diagnosis: Right hip osteoarthritis    Procedure: Right total hip arthroplasty by direct anterior approach with Anderson robotic-assisted imageless computer navigation    Surgeon: Nora Burris MD    Assistant/Staff: Circ-1: Misha Rodas RN  Circ-Relief: Jose Jett; Ori Correa RN  Scrub RN-1: Jacqueline Rico  Scrub RN-Relief: Maame Blunt RN  Surg Asst-1: Anne Bernal Staff: Frugoton Stairs    Anesthesia: General    Preoperative IV Antibiotics: Ancef 2g    Estimated Blood Loss: 250 mL    Implants: Fran: Trident II Acetabular component size 56 mm, Polyethylene insert, Femoral head ceramic Biolox delta size 36 + 0mm, Accolade II Femoral component Standard Offset size 6     Findings: femoral head and acetabular osteoarthritis and cartilage loss    Specimens: None    Complications: None    Disposition: PACU - hemodynamically stable. Condition: stable      Indication for Procedure: The patient presented to my office on an outpatient basis complaining of right hip pain. Evaluation revealed osteoarthritis and the patient failed non operative management. We discussed nonsurgical and surgical management options and chose to undergo total hip arthroplasty by direct anterior approach. We discussed the risks and benefits, including leg length discrepancy, lateral thigh numbness, infection, blood loss, blood clot, continued pain, anesthetic complications including death. We also discussed the use of Anderson robotic-assisted surgery and obtained a preoperative CT for deformity evaluation and implant planning. The patient elected to proceed forth with surgery.     Procedure in Detail:  The patient was met in the preoperative holding area and the appropriate surgical site was marked. All questions were answered. The patient was brought into the operating room given general anesthesia. The patient was placed on a [place] table with a perineal post and bony prominences well padded. The right hip was prepped and draped in the usual sterile fashion. The contralateral iliac wing was sterilely prepped into the field. A multi-disciplinary time-out was performed. Prophylactic antibiotics and tranexamic acid was dosed. X-ray C-arm fluoroscopy was used throughout the procedure for implant sizing and placement. I began by placing 2 iliac wing pins in the contralateral pelvis and attached the pelvic array. I then took the direct anterior approach to the right hip. An incision was made directly over the tensor fascia liana and subcutaneous dissection was performed until I arrived at the fascia. Muscle belly was visible underneath the fascia and confirmed its direction to the ASIS. The fascia was incised directly over the muscle belly, and the muscle was bluntly dissected from the medial fascia using my finger. Retractors were placed over the femoral neck. The ascending branch of the lateral femoral cutaneous artery was cauterized. The rectus was elevated from the anterior capsule. A capsular incision was made along the femoral neck and medial and lateral capsule was tagged. Blunt retractors were placed intra-capsular and the femoral neck was identified. A femoral checkpoint was placed and registered with the computer. After confirming the appropriate level for the femoral neck cut on fluoroscopic x-ray, a sagittal saw was used to make a femoral neck osteotomy. A napkin ring cut was also made. Part of the femoral neck was removed and the femoral head was removed from the acetabulum. Acetabular retractors were placed and the labrum and pulvinar were excised. An acetabular check point was placed superiorly.  Registration of the acetabulum was performed and passed successfully. A single ream was performed to the templated depth and goal version of 40 degrees abduction and 18 degrees anteversion. The acetabular component was then inserted at the planned angle. The machine arm was unscrewed and the cup was confirmed to be well secured with excellent fixation by a line-to-line press-fit. The acetabular component was confirmed to be visually down to bone through the  and screw holes. X-ray fluoroscopy confirmed appropriate version and seating of the cup on x-ray. The cup orientation was confirmed using the robot and registered at 39 abduction degrees abduction and 18 Anteverion degrees anteversion. The final polyethylene liner was inserted and impacted. Next, we turned our attention to the femoral component. The femoral neck was delivered through the incision after releasing the posterior capsule and elevating with retractors. The leg was placed in external rotation, full extension, and adduction. I opened the metaphysis using a rongeur followed by the entry broach. I then inserted the sucker tip intramedullary in order to provide a path for sequential broaching and ensure no cortical penetration. I broached sequentially up to the appropriate size. The hip was reduced and leg lengths were measured using the computer with a discrepancy of 0 mm. Stability checks using external rotation to 90 degrees at a neutral position followed by external rotation to 90 degrees with hip in 45 degrees of extension showed no anterior subluxation or dislocation. An Irricept lavage was performed. The final femoral stem was inserted and the process was repeated using a trial femoral head. Again I confirmed stability and implant positioning before placing the final femoral head. I implanted the final femoral head and passed a final stability check.  The femoral checkpoint had dilodged from its fixation point in bone, although it showed there was a 2mm discrepancy, I relied on the xray rather than the computer system. The femoral and acetabular check points were removed. The wound was copiously irrigated with normal saline. A capsular injection was given. A 2nd dose of tranexamic acid was given. The fascia was closed with #1 Vicryl suture and #2 Strata Fix running suture. The skin was closed with 2-0 Vicryl, followed by [place] Zipline topically. AquacelAg was used to cover the wound. Pins were removed from the contralateral pelvic wing and the wound closed in the same fashion. Surgical count was correct at the end of the procedure. The patient was removed from the table and awakened from anesthesia. The patient was placed on a postoperative bed and brought to the postanesthesia care unit in stable condition. Postoperative plan: The right lower extremity is weightbearing as tolerated. Anterior hip precautions should be followed. Physical therapy will be consulted for gait training with anticipated discharge when stable and pain controlled. DVT prophylaxis will be given for 6 weeks.         Jean Cash MD

## 2021-12-16 NOTE — DISCHARGE INSTRUCTIONS
Dr. Belgica Stewart Total Hip Replacement Postoperative Instructions    Follow-Up Appointment:  o Follow up in the office 2 weeks after surgery. If this appointment is not already scheduled, please call our office at [phone].  Activity:  o Unless you have been specifically instructed otherwise, you can advance your activity as tolerated. o Ambulate every hour. Use your incentive spirometer every half hour when resting. o Perform your exercises as often as possible, at least 3 times a day.  o Avoid extremes of motion and vigorous activities. o Hip Precautions: Avoid planting your operative foot and rotating (turning) at the hip. Also, keep your toes pointing relatively straightforward without excessive inward or outward turn. Avoid bringing your knee past your belly button. o Avoid low seats, recliners, and bleachers for the first 6-8 weeks  o You may bear weight fully on your operative extremity with a walker and transition to a cane as soon as you feel comfortable and strong enough. That being said, advance your activity in a stepwise and cautious manner. You will likely feel better than your replaced joint is ready for.    o Refrain from any high-level activities until we see you back at your follow up appointment. This includes golfing, exercising, and other more intense activities. o Prevent any falls. Clear your living environment of rugs or floor objects that may be tripping hazards. Use an assistive device as needed for balance and support.  o Application of the ice packs will prevent and treat inflammation and reduce pain and swelling. You should ice the incisional area at least 3x/day, for 20-30 minutes.  Dressings / Wound Care:  o Keep dressing in place until the follow-up visit.   o Do not apply antibiotic ointment, creams or lotions to your incision.  o Once your waterproof dressing has been removed, you may change bandages daily if you like or leave them open to air.   These can be purchased at your local pharmacy. o Most incisions are closed with absorbable sutures, but if not, the sutures or staples will be removed at your 2 week follow up.  o Dermabond (skin glue) may be used to help close the incision, which appears as a thin, transparent covering over the incision. Do not pick or pull at this covering, this will fall off naturally within 2-3 weeks. o I recommend using Compression stockings as much as tolerated for 2-4 weeks after surgery to reduce swelling and return blood flow. They can be removed for showering or if needed for breaks. o If there is continued drainage or you are concerned contact Dr. Jose Maria Mayorga office at [phone] / Audrene Denise:  o If your incision is dry without drainage, you may shower following your discharge home. The dressing is waterproof if well affixed to skin.  o You may shower with the waterproof dressing in place, but please be sure it is adhered to the skin and does not allow water to get underneath.   o It is fine to have water run over the incision after the waterproof dressing has been removed. o Do not vigorously scrub your incision. o Do not soak or submerge in a bath, pool, jacuzzi, ocean, river or lake for 6 weeks after surgery.  Diet:  o You may advance to your regular diet as tolerated. o Proper nutrition is crucial to healing. Make sure you are eating as healthily as possible and following a balanced. protein-rich diet after your surgery. o Avoid processed foods and eat fruits and vegetables.  Medications:  o It is our goal to keep you as comfortable as possible after your surgery. Please take your medications as prescribed without exceeding the recommended dosage. o It is also important to understand that pain has a cycle. It begins and increases until medication interrupts it. The aim of good pain control is to stop the pain before it becomes intolerable.   The key is to stay ahead of the pain.  o We encourage patients to discontinue opioid pain medications as soon as possible after surgery. o The side effects of these medications can be substantial, and the narcotic medications are not mandatory. You may substitute a prescribed narcotic with over-the-counter Tylenol. o Pain medications may cause constipation. Over-the-counter Colace twice daily and Miralax while taking the narcotic medication should help prevent constipation. o Other side effects of pain medication include dizziness, headache, nausea, vomiting, and urinary retention. o Discontinue the pain medication if you develop itching, rash, shortness of breath, or difficulties swallowing. If these symptoms become severe or are not relieved by discontinuing the medication, you should seek immediate medical attention. o Refills of pain medication are authorized during office hours only (8 AM- 4 PM Monday through Friday). Our office will not prescribe narcotics beyond 6 weeks from the date of your surgery. o Narcotics will not be called into the pharmacy, and, in most cases, you must be seen clinically.  Blood Thinners:  o You will be given a prescription for either Aspirin or Xarelto based on your health history. If you are on a blood thinner pre surgery, they will continue following surgery for prophylaxis.  o You should take these medications as prescribed for 6 weeks following your surgery.  Driving:  o You should not return to driving until you are off all narcotic pain medications and able to safely and quickly apply the brakes. This is normally 2-4 weeks for left sided joint replacements and 2-6 weeks for right-sided joint replacements.  Airports and metal detectors:  o ID cards are no longer given after joint replacement, as they are not accepted by [employer] security. Most joint replacements do not set off metal detectors. If the detector is set off, just tell the  you have a joint replacement.    Signs/Symptoms of Concern: o Contact Dr. Mary Hensley office if any of the following signs or symptoms develop. Please be advised if a problem arises which you feel requires immediate medical attention you should seek medical attention at the closest ER.  - Temperature greater than 101.5 for more than 8 hrs  - Fever and/or chills that persist for greater than 8 hr.  - A sudden increase in pain/swelling/ or tenderness in the back of your calf or thigh that is not relieved with ice/elevation and pain medication. o Increased drainage from your incision, increased redness or warmth at the area of your incision or a sudden increase in swelling or redness that persists despite ice and elevation      If you have questions or concerns, please call Dr. Mary Hensley staff    Office: Phone [phone]  Fax [phone]    Office Address: Handy Mcdermott M.D.     [address] [place] [address]    Handy Mcdermott MD      12/16/21

## 2021-12-16 NOTE — ANESTHESIA PREPROCEDURE EVALUATION
Relevant Problems   No relevant active problems       Anesthetic History   No history of anesthetic complications            Review of Systems / Medical History  Patient summary reviewed, nursing notes reviewed and pertinent labs reviewed    Pulmonary  Within defined limits                 Neuro/Psych   Within defined limits           Cardiovascular    Hypertension: well controlled                   GI/Hepatic/Renal                Endo/Other        Arthritis     Other Findings            Physical Exam    Airway  Mallampati: I  TM Distance: > 6 cm  Neck ROM: normal range of motion   Mouth opening: Normal     Cardiovascular    Rhythm: regular           Dental  No notable dental hx       Pulmonary                 Abdominal         Other Findings            Anesthetic Plan    ASA: 2  Anesthesia type: general          Induction: Intravenous  Anesthetic plan and risks discussed with: Patient

## 2021-12-16 NOTE — INTERVAL H&P NOTE
Update History & Physical    The Patient's History and Physical of December 6, 2021 was reviewed with the patient and I examined the patient. There was no change. The surgical site was confirmed by the patient and me. Plan:  The risk, benefits, expected outcome, and alternative to the recommended procedure have been discussed with the patient. Patient understands and wants to proceed with the procedure.     Electronically signed by Shaq Bartholomew MD on 12/16/2021 at 10:57 AM

## 2021-12-17 ENCOUNTER — HOME HEALTH ADMISSION (OUTPATIENT)
Dept: HOME HEALTH SERVICES | Facility: HOME HEALTH | Age: 56
End: 2021-12-17
Payer: COMMERCIAL

## 2021-12-17 VITALS
SYSTOLIC BLOOD PRESSURE: 145 MMHG | OXYGEN SATURATION: 98 % | WEIGHT: 264.55 LBS | TEMPERATURE: 97.6 F | RESPIRATION RATE: 18 BRPM | BODY MASS INDEX: 35.83 KG/M2 | HEART RATE: 73 BPM | HEIGHT: 72 IN | DIASTOLIC BLOOD PRESSURE: 77 MMHG

## 2021-12-17 LAB
ANION GAP SERPL CALC-SCNC: 6 MMOL/L (ref 5–15)
BUN SERPL-MCNC: 12 MG/DL (ref 6–20)
BUN/CREAT SERPL: 12 (ref 12–20)
CALCIUM SERPL-MCNC: 8.3 MG/DL (ref 8.5–10.1)
CHLORIDE SERPL-SCNC: 103 MMOL/L (ref 97–108)
CO2 SERPL-SCNC: 24 MMOL/L (ref 21–32)
CREAT SERPL-MCNC: 1.04 MG/DL (ref 0.7–1.3)
GLUCOSE SERPL-MCNC: 131 MG/DL (ref 65–100)
HGB BLD-MCNC: 11.5 G/DL (ref 12.1–17)
POTASSIUM SERPL-SCNC: 3.5 MMOL/L (ref 3.5–5.1)
SODIUM SERPL-SCNC: 133 MMOL/L (ref 136–145)

## 2021-12-17 PROCEDURE — 97161 PT EVAL LOW COMPLEX 20 MIN: CPT

## 2021-12-17 PROCEDURE — 36415 COLL VENOUS BLD VENIPUNCTURE: CPT

## 2021-12-17 PROCEDURE — 97165 OT EVAL LOW COMPLEX 30 MIN: CPT

## 2021-12-17 PROCEDURE — 80048 BASIC METABOLIC PNL TOTAL CA: CPT

## 2021-12-17 PROCEDURE — 74011000250 HC RX REV CODE- 250: Performed by: ORTHOPAEDIC SURGERY

## 2021-12-17 PROCEDURE — 77030012893

## 2021-12-17 PROCEDURE — 77030041034 HC KT HIP PATT -B

## 2021-12-17 PROCEDURE — 74011250637 HC RX REV CODE- 250/637: Performed by: ORTHOPAEDIC SURGERY

## 2021-12-17 PROCEDURE — 97110 THERAPEUTIC EXERCISES: CPT

## 2021-12-17 PROCEDURE — 74011250636 HC RX REV CODE- 250/636: Performed by: ORTHOPAEDIC SURGERY

## 2021-12-17 PROCEDURE — 97116 GAIT TRAINING THERAPY: CPT

## 2021-12-17 PROCEDURE — G0378 HOSPITAL OBSERVATION PER HR: HCPCS

## 2021-12-17 PROCEDURE — 85018 HEMOGLOBIN: CPT

## 2021-12-17 PROCEDURE — 97535 SELF CARE MNGMENT TRAINING: CPT

## 2021-12-17 PROCEDURE — 2709999900 HC NON-CHARGEABLE SUPPLY

## 2021-12-17 PROCEDURE — 97530 THERAPEUTIC ACTIVITIES: CPT

## 2021-12-17 RX ORDER — GUAIFENESIN 100 MG/5ML
81 LIQUID (ML) ORAL DAILY
Qty: 84 TABLET | Refills: 0 | Status: SHIPPED | OUTPATIENT
Start: 2021-12-17 | End: 2021-12-21 | Stop reason: SINTOL

## 2021-12-17 RX ORDER — METHOCARBAMOL 500 MG/1
500 TABLET, FILM COATED ORAL
Qty: 30 TABLET | Refills: 0 | Status: SHIPPED | OUTPATIENT
Start: 2021-12-17

## 2021-12-17 RX ORDER — CELECOXIB 100 MG/1
100 CAPSULE ORAL 2 TIMES DAILY
Qty: 30 CAPSULE | Refills: 0 | Status: SHIPPED | OUTPATIENT
Start: 2021-12-17 | End: 2022-01-01

## 2021-12-17 RX ORDER — OXYCODONE HYDROCHLORIDE 5 MG/1
5 TABLET ORAL
Qty: 30 TABLET | Refills: 0 | Status: SHIPPED | OUTPATIENT
Start: 2021-12-17 | End: 2021-12-24

## 2021-12-17 RX ADMIN — ASPIRIN 81 MG: 81 TABLET, COATED ORAL at 10:44

## 2021-12-17 RX ADMIN — OXYCODONE 10 MG: 5 TABLET ORAL at 10:44

## 2021-12-17 RX ADMIN — Medication 15 MG: at 00:23

## 2021-12-17 RX ADMIN — SODIUM CHLORIDE 125 ML/HR: 900 INJECTION, SOLUTION INTRAVENOUS at 00:23

## 2021-12-17 RX ADMIN — CEFAZOLIN SODIUM 2 G: 1 INJECTION, POWDER, FOR SOLUTION INTRAMUSCULAR; INTRAVENOUS at 03:13

## 2021-12-17 RX ADMIN — OXYCODONE 5 MG: 5 TABLET ORAL at 03:17

## 2021-12-17 RX ADMIN — POLYETHYLENE GLYCOL 3350 17 G: 17 POWDER, FOR SOLUTION ORAL at 10:45

## 2021-12-17 RX ADMIN — DIPHENHYDRAMINE HYDROCHLORIDE 25 MG: 25 CAPSULE ORAL at 05:44

## 2021-12-17 RX ADMIN — OXYCODONE 5 MG: 5 TABLET ORAL at 00:23

## 2021-12-17 RX ADMIN — HYDROCHLOROTHIAZIDE 25 MG: 25 TABLET ORAL at 10:44

## 2021-12-17 RX ADMIN — OXYCODONE 5 MG: 5 TABLET ORAL at 03:13

## 2021-12-17 RX ADMIN — DEXTROAMPHETAMINE SACCHARATE, AMPHETAMINE ASPARTATE, DEXTROAMPHETAMINE SULFATE, AMPHETAMINE SULFATE TABLETS, 10 MG,CLL 20 MG: 2.5; 2.5; 2.5; 2.5 TABLET ORAL at 10:44

## 2021-12-17 RX ADMIN — Medication 10 ML: at 06:00

## 2021-12-17 RX ADMIN — OXYCODONE 10 MG: 5 TABLET ORAL at 13:52

## 2021-12-17 RX ADMIN — LOSARTAN POTASSIUM 100 MG: 50 TABLET, FILM COATED ORAL at 10:44

## 2021-12-17 RX ADMIN — OXYCODONE 10 MG: 5 TABLET ORAL at 06:53

## 2021-12-17 RX ADMIN — FAMOTIDINE 20 MG: 20 TABLET ORAL at 10:45

## 2021-12-17 RX ADMIN — SODIUM CHLORIDE 125 ML/HR: 900 INJECTION, SOLUTION INTRAVENOUS at 06:57

## 2021-12-17 RX ADMIN — DOCUSATE SODIUM 50 MG AND SENNOSIDES 8.6 MG 1 TABLET: 8.6; 5 TABLET, FILM COATED ORAL at 10:44

## 2021-12-17 RX ADMIN — Medication 15 MG: at 07:00

## 2021-12-17 NOTE — PROGRESS NOTES
Physical Therapy 12/17/21    Orders received, chart reviewed and patient evaluated by physical therapy. Pt cleared for discharge from PT standpoint - pt has required DME. Recommend:  Physical therapy at least 2 days/week in the home        Full evaluation to follow.      Leesa Porter, PT

## 2021-12-17 NOTE — PROGRESS NOTES
Progress Note    Status Post: Right total hip arthroplasty by Direct Anterior approach  Date of Surgery: 12/17/21  Surgeon: Dr. Laury Riley    Subjective:     No acute events over night. Mireya Rivera states that he is doing well. His pain is well controlled. He was walked to the bathroom several times. Denies CP, SOB, nausea/vomitting, parasthesias. Objective:     Visit Vitals  BP (!) 145/77 (BP 1 Location: Right upper arm, BP Patient Position: Sitting)   Pulse 73   Temp 97.6 °F (36.4 °C)   Resp 18   Ht 6' (1.829 m)   Wt 120 kg (264 lb 8.8 oz)   SpO2 98%   BMI 35.88 kg/m²       Patient Vitals for the past 24 hrs:   Temp Pulse Resp BP SpO2   12/17/21 1040 97.6 °F (36.4 °C) 73 18 (!) 145/77 98 %   12/17/21 0956 -- -- -- (!) 143/80 --   12/17/21 0952 -- -- -- (!) 153/87 --   12/17/21 0933 -- -- -- (!) 147/79 --   12/17/21 0327 98.5 °F (36.9 °C) 81 16 (!) 154/86 95 %   12/16/21 2036 97.8 °F (36.6 °C) 85 16 (!) 167/76 94 %   12/16/21 1949 98.1 °F (36.7 °C) 85 16 (!) 154/87 95 %   12/16/21 1827 97.8 °F (36.6 °C) 91 -- (!) 156/89 94 %   12/16/21 1715 97.8 °F (36.6 °C) 83 16 (!) 148/99 94 %   12/16/21 1646 -- 62 14 (!) 163/88 96 %   12/16/21 1642 98.1 °F (36.7 °C) -- -- -- --   12/16/21 1630 -- 78 20 (!) 153/94 --   12/16/21 1628 -- -- -- (!) 169/90 --   12/16/21 1615 -- 70 13 (!) 165/99 94 %   12/16/21 1604 -- 83 14 (!) 155/91 93 %   12/16/21 1600 -- 72 17 (!) 163/92 92 %   12/16/21 1555 -- 73 22 (!) 156/90 92 %   12/16/21 1550 -- 76 24 (!) 150/95 90 %   12/16/21 1545 -- 80 15 (!) 143/96 93 %   12/16/21 1544 98.1 °F (36.7 °C) 86 12 (!) 143/96 94 %   12/16/21 1542 98.1 °F (36.7 °C) -- -- (!) 155/93 95 %        Physical Exam:  Gen: NAD, AAOx3  Resp: No acute respiratory distress  MSK:  RLE  Dressing is clean, dry, and intact. Mild bloody spotting on the drainage in the central and lower areas.   Motor intact distally  Sensation is intact to light touch distally   Mild swelling about the hip Intake/Output Summary (Last 24 hours) at 12/17/2021 1157  Last data filed at 12/17/2021 0554  Gross per 24 hour   Intake 1700 ml   Output 2550 ml   Net -850 ml       LABS :  Recent Results (from the past 24 hour(s))   METABOLIC PANEL, BASIC    Collection Time: 12/17/21  2:48 AM   Result Value Ref Range    Sodium 133 (L) 136 - 145 mmol/L    Potassium 3.5 3.5 - 5.1 mmol/L    Chloride 103 97 - 108 mmol/L    CO2 24 21 - 32 mmol/L    Anion gap 6 5 - 15 mmol/L    Glucose 131 (H) 65 - 100 mg/dL    BUN 12 6 - 20 MG/DL    Creatinine 1.04 0.70 - 1.30 MG/DL    BUN/Creatinine ratio 12 12 - 20      GFR est AA >60 >60 ml/min/1.73m2    GFR est non-AA >60 >60 ml/min/1.73m2    Calcium 8.3 (L) 8.5 - 10.1 MG/DL   HEMOGLOBIN    Collection Time: 12/17/21  2:48 AM   Result Value Ref Range    HGB 11.5 (L) 12.1 - 17.0 g/dL        Assessment:   Josue Cam is a 49y.o. year-old male with Right hip osteoarthritis status post total hip replacement by Direct Anterior Approach POD #1    Plan:   -Weighbearing: WBAT RLE, device as needed  -Hip precautions: Anterior  -DVT prophylaxis: SCDs, frequent ambulation, ASA 81mg BID  -Antibiotics: for 24h  -Pain control: Continue as needed pain management, ice to operative area  -PT/OT for mobilization and gait training.   -Incentive Spirometry  -Dispo: Discharge planning to Home today  Follow up with me in office in 10-14 days    -I had a conversation with the pharmacist who flagged the aspirin prescription because it may potentiate the effects of the methotrexate. They recommended using Xarelto for DVT prophylaxis as it should not affect the methotrexate. A new prescription for this was sent. I will have him not take the aspirin as we originally planned. I called the patient's phone number to communicate this change but he did not answer and the mailbox was full. Dimas Ny MD    12/17/21  11:57 AM        Dimas Ny M.D.   [address] [facility] Office  Cell: [phone]  Office: [phone]  Medical Staff: Yadi Hughes MA

## 2021-12-17 NOTE — ROUTINE PROCESS
Patient assessed for readiness to ambulate. Vital Signs  Level of Consciousness: Alert (0)  Temp: 97.8 °F (36.6 °C)  Temp Source: Oral  Pulse (Heart Rate): 85  Heart Rate Source: Brachial  Cardiac Rhythm: Sinus Rhythm  Resp Rate: 16  BP: (!) 167/76  MAP (Calculated): 106  BP 1 Location: Right upper arm  BP 1 Method: Automatic  BP Patient Position: Lying  MEWS Score: 1. Patient ambulated with assistance of 2 nurses. Patient ambulated with gait belt and walker. Patient walked to sidestepped to Head of Bed. Patient returned safely to bed.

## 2021-12-17 NOTE — PROGRESS NOTES
Problem: Self Care Deficits Care Plan (Adult)  Goal: *Acute Goals and Plan of Care (Insert Text)  Description: FUNCTIONAL STATUS PRIOR TO ADMISSION: Patient was independent and active without use of DME.    HOME SUPPORT: The patient lived with wife but did not require assist.    Occupational Therapy Goals  Initiated 12/17/2021  1. Patient will perform lower body ADLs with AE supervision/set-up within 4 day(s). 2.  Patient will perform upper body ADLs standing 5 mins without fatigue or LOB with supervision/set-up within 4 day(s). 3.  Patient will perform toilet transfer with supervision/set-up within 4 day(s). 4.  Patient will perform all aspects of toileting with supervision/set-up within 4 day(s). 5.  Patient will participate in upper extremity therapeutic exercise/activities with supervision/set-up for 10 minutes within 4 day(s). 6.  Patient will utilize energy conservation techniques during functional activities without cues within 4 day(s). Outcome: Progressing Towards Goal   OCCUPATIONAL THERAPY EVALUATION  Patient: Irasema Cardenas (25 y.o. male)  Date: 12/17/2021  Primary Diagnosis: Secondary osteoarthritis of hip [M16.7]  Osteoarthritis of right hip [M16.11]  Status post total hip replacement, right [Z96.641]  Procedure(s) (LRB):  RIGHT TOTAL HIP ARTHROPLASTY ANTERIOR APPROACH (PAUL) (FAST TRACK) (Right) 1 Day Post-Op   Precautions: Falls, WBAT       ASSESSMENT  Based on the objective data described below, the patient presents with impaired balance, strength, and ability to complete ADL/IADL at baseline. Patient completed all ADL including bathroom functional mobility and toileting with up to CGA. Benefits from general safety cues when utilizing RW and hand placement during transfers. Discussed ADL/environmental modifications to maximize ADL independence/safety at home, patient verbalized understanding, demo'd good carryover during session.  Patient and wife with good understanding of precautions and modifications. Patient primarily limited by high BP (consistently 140s/70s throughout session after provided with correct cuff), however per chart and patient has been high prior to admission. Pending medical clearance, patient cleared from OT standpoint for discharge home. Current Level of Function Impacting Discharge (ADLs/self-care): up to CGA    Functional Outcome Measure: The patient scored 75/100 on the Barthel Index outcome measure which is indicative of 25% functional impairment. Other factors to consider for discharge: below baseline, ELIE anterior approach     Patient will benefit from skilled therapy intervention to address the above noted impairments. PLAN :  Recommendations and Planned Interventions: self care training, functional mobility training, therapeutic exercise, therapeutic activities, endurance activities, patient education, home safety training and family training/education    Frequency/Duration: Patient will be followed by occupational therapy 5 times a week to address goals. Recommendation for discharge: (in order for the patient to meet his/her long term goals)  No skilled occupational therapy/ follow up rehabilitation needs identified at this time. This discharge recommendation:  Has not yet been discussed the attending provider and/or case management    IF patient discharges home will need the following DME: patient owns DME required for discharge       SUBJECTIVE:   Patient stated I know this BP machine or cuff isn't working.     OBJECTIVE DATA SUMMARY:   HISTORY:   Past Medical History:   Diagnosis Date    High cholesterol     Hypertension     Psoriasis     Psoriatic arthritis ([facility] [address]      Past Surgical History:   Procedure Laterality Date    HX HERNIA REPAIR  2006    HX TONSILLECTOMY      HX TOTAL COLECTOMY  2001    HX VASECTOMY         Expanded or extensive additional review of patient history:     Home Situation  Home Environment: Private residence  # Steps to Enter: 4  Rails to Enter: Yes  [employer] : Right  One/Two Story Residence: Two story  # of Interior Steps: 12  Interior Rails: Both  Living Alone: No  Support Systems: Spouse/Significant Other  Patient Expects to be Discharged toVF Cor[de-identified]ration  Current DME Used/Available at Home: [facility], straight,Walker, rolling,Shower chair  Tub or Shower Type: Shower    Hand dominance: Right    EXAMINATION OF PERFORMANCE DEFICITS:  Cognitive/Behavioral Status:  Neurologic State: Alert  Orientation Level: Oriented X4  Cognition: Appropriate for age attention/concentration; Appropriate decision making; Appropriate safety awareness; Follows commands  Perception: Appears intact  Perseveration: No perseveration noted  Safety/Judgement: Awareness of environment; Fall prevention; Insight into deficits    Skin: Surgical dressing C/D/I      Edema: RLE near incision    Hearing: Auditory  Auditory Impairment: None    Vision/Perceptual:    Tracking: Able to track stimulus in all quadrants w/o difficulty                                Range of Motion:  AROM: Within functional limits  PROM: Within functional limits                      Strength:  Strength: Within functional limits                Coordination:  Coordination: Within functional limits  Fine Motor Skills-Upper: Left Intact; Right Intact    Gross Motor Skills-Upper: Left Intact; Right Intact    Tone & Sensation:  Tone: Normal  Sensation: Impaired (impaired sensation surrounding achilles tendon)                      Balance:  Sitting: With support; Intact  Standing: Impaired; Without support  Standing - Static: Constant support;Good  Standing - Dynamic : Constant support; Fair    Functional Mobility and Transfers for ADLs:  Bed Mobility:  Supine to Sit: Supervision    Transfers:  Sit to Stand: Contact guard assistance  Stand to Sit: Contact guard assistance  Bed to Chair: Contact guard assistance  Bathroom Mobility: Contact guard assistance  Toilet Transfer : Contact guard assistance    ADL Assessment:  Patient recalled and demonstrated avoiding extreme planes of movement with Right LE during ADLs and functional mobility with verbal cues. Feeding: Independent    Oral Facial Hygiene/Grooming: Supervision                                    ADL Intervention and task modifications:         Grooming  Grooming Assistance: Supervision  Washing Face: Supervision  Washing Hands: Supervision  Cues: Verbal cues provided                   Lower Body Dressing Assistance  Underpants: Supervision  Pants With Elastic Waist: Supervision  Socks: Supervision  Leg Crossed Method Used: No  Position Performed: Seated edge of bed  Cues: Verbal cues provided    Toileting  Toileting Assistance: Contact guard assistance  Bladder Hygiene: Contact guard assistance  Clothing Management: Contact guard assistance  Cues: Verbal cues provided  Adaptive Equipment: Walker    Cognitive Retraining  Safety/Judgement: Awareness of environment; Fall prevention; Insight into deficits    Bathing: Patient instructed when bathing to not submerge wound in water, stand to shower or sponge bathe, cover wound with plastic and tape to ensure no water reaches bandage/wound without cues. Patient indicated understanding. Dressing joint: Patient instructed and demonstrated to don/doff Right LE first/last verbal cues. Patient instructed and demonstrated to don all clothing while sitting prior to standing, doff all clothing to knees while standing, then sit to doff clothing off from knees to feet in order to facilitate fall prevention, pain management, and energy conservation with Supervision. Home safety: Patient instructed on home modifications and safety (raise height of ADL objects, appropriate height of chair surfaces, recliner safety, change of floor surfaces, clear pathways) to increase independence and fall prevention. Patient indicated understanding.   Standing: Patient instructed and demonstrated to walk up to sink/counter top/surfaces, step into walker to increase safety of joint and fall prevention with Supervision. Instructed to apply concept of hip contraindications to ADLs within the home (no extreme reaching across body to Right side, square off while using objects, slide objects along surfaces). Patient instructed to increase amount of time standing, observe standing position during ADLs in order to increase even weight bearing through bilateral LEs in order to increase independence with ADLs. Goal to be reached 30 days post - op, per orthopedic surgeon or per PT. Patient indicated understanding. Tub transfer: Patient instructed regarding when it is safe to begin transfer into tub (complete stairs with PT, advance exercises with PT high enough to clear tub height). Patient instructed to use the same technique as used with stairs when entering and exiting tub (\"up with the non-surgical, down with the surgical leg\"). Patient indicated understanding. Functional Measure:    Barthel Index:  Bathin  Bladder: 10  Bowels: 10  Groomin  Dressin  Feeding: 10  Mobility: 10  Stairs: 0  Toilet Use: 10  Transfer (Bed to Chair and Back): 10  Total: 75/100      The Barthel ADL Index: Guidelines  1. The index should be used as a record of what a patient does, not as a record of what a patient could do. 2. The main aim is to establish degree of independence from any help, physical or verbal, however minor and for whatever reason. 3. The need for supervision renders the patient not independent. 4. A patient's performance should be established using the best available evidence. Asking the patient, friends/relatives and nurses are the usual sources, but direct observation and common sense are also important. However direct testing is not needed. 5. Usually the patient's performance over the preceding 24-48 hours is important, but occasionally longer periods will be relevant.   6. Middle categories imply that the patient supplies over 50 per cent of the effort. 7. Use of aids to be independent is allowed. Score Interpretation (from [facility])    Independent   60-79 Minimally independent   40-59 Partially dependent   20-39 Very dependent   <20 Totally dependent     -Osmani Rodriguez., Barthel, D.W. (1965). Functional evaluation: the Barthel Index. [address] ([address] (1997). The Barthel activities of daily living index: self-reporting versus actual performance in the old (> or = 75 years). Journal of [address] [place] [address] 45(7), 14 [facility], J.MEAGANF, Daniel Israel., Bianka Nino. (1999). Measuring the change in disability after inpatient rehabilitation; comparison of the responsiveness of the Barthel Index and Functional [place] Measure. Journal of Neurology, Neurosurgery, and Psychiatry, 66(4), 160-175. Telma Santiago, N.J.A, NATALIA Silva, & Logan Mckeon M.A. (2004) Assessment of post-stroke quality of life in cost-effectiveness studies: The usefulness of the Barthel Index and the EuroQoL-5D.  Quality of Life Research, 15, 176-58         Occupational Therapy Evaluation Charge Determination   History Examination Decision-Making   LOW Complexity : Brief history review  LOW Complexity : 1-3 performance deficits relating to physical, cognitive , or psychosocial skils that result in activity limitations and / or participation restrictions  LOW Complexity : No comorbidities that affect functional and no verbal or physical assistance needed to complete eval tasks       Based on the above components, the patient evaluation is determined to be of the following complexity level: LOW   Pain Rating:  None reported    Activity Tolerance:   Good and tolerates ADLs without rest breaks    After treatment patient left in no apparent distress:    Sitting in chair, Call bell within reach and Caregiver / family present    COMMUNICATION/EDUCATION:   The patients plan of care was discussed with: Physical therapist.     Home safety education was provided and the patient/caregiver indicated understanding., Patient/family have participated as able in goal setting and plan of care. and Patient/family agree to work toward stated goals and plan of care. This patients plan of care is appropriate for delegation to [facility].     Thank you for this referral.  Deidre Ta OT  Time Calculation: 32 mins

## 2021-12-17 NOTE — PROGRESS NOTES
PHYSICAL THERAPY EVALUATION/DISCHARGE  Patient: Kayley Ortiz (29 y.o. male)  Date: 12/17/2021  Primary Diagnosis: Secondary osteoarthritis of hip [M16.7]  Osteoarthritis of right hip [M16.11]  Status post total hip replacement, right [Z96.641]  Procedure(s) (LRB):  RIGHT TOTAL HIP ARTHROPLASTY ANTERIOR APPROACH (PAUL) (FAST TRACK) (Right) 1 Day Post-Op   Precautions: WBAT         ASSESSMENT  Based on the objective data described below, the patient presents POD# 1 Right ELIE with minimal pain right hip, decreased AROM/strength and function right leg, decreased activity tolerance, decline in functional mobility and impaired standing balance/gait. Pt doing well post-op - able to progress amb endurance and perform stairs with rail/cane. Pt cleared for discharge from PT standpoint. Functional Outcome Measure: The patient scored 80/100 on the Barthel Index outcome measure . Other factors to consider for discharge: Wife to assist as needed at home     Further skilled acute physical therapy is not indicated at this time. PLAN :  Recommendation for discharge: (in order for the patient to meet his/her long term goals)  Physical therapy at least 2 days/week in the home     This discharge recommendation:  Has been made in collaboration with the attending provider and/or case management    IF patient discharges home will need the following DME: patient owns DME required for discharge       SUBJECTIVE:   Patient stated I'm ready to go home.     OBJECTIVE DATA SUMMARY:   HISTORY:    Past Medical History:   Diagnosis Date    High cholesterol     Hypertension     Psoriasis     Psoriatic arthritis ([facility] [address]      Past Surgical History:   Procedure Laterality Date    HX HERNIA REPAIR  2006    HX TONSILLECTOMY      HX TOTAL COLECTOMY  2001    HX VASECTOMY         Prior level of function: Independent mobility - occ used cane - required no assist from wife  Personal factors and/or comorbidities impacting plan of care: as above    Home Situation  Home Environment: Private residence  # Steps to Enter: 4  Rails to Enter: Yes  Hand Rails : Right  One/Two Story Residence: Two story  # of Interior Steps: 12  Interior Rails: Both  Living Alone: No  Support Systems: Spouse/Significant Other  Patient Expects to be Discharged toF Cor[de-identified]ration  Current DME Used/Available at Home: [employer], straight,Walker, rolling,Shower chair  Tub or Shower Type: Shower    EXAMINATION/PRESENTATION/DECISION MAKING:   Critical Behavior:  Neurologic State: Alert  Orientation Level: Appropriate for age,Oriented X4  Cognition: Appropriate decision making,Appropriate for age attention/concentration,Appropriate safety awareness  Safety/Judgement: Awareness of environment,Fall prevention,Insight into deficits  Hearing: Auditory  Auditory Impairment: None  Range Of Motion:  AROM: Within functional limits           PROM: Within functional limits           Strength:    Strength: Within functional limits (except right hip)                    Tone & Sensation:   Tone: Normal              Sensation: Impaired (decreased sensation around achilles tendon)               Coordination:  Coordination: Within functional limits  Vision:   Tracking: Able to track stimulus in all quadrants w/o difficulty  Functional Mobility:  Bed Mobility:     Supine to Sit: Supervision        Transfers:  Sit to Stand: Stand-by assistance  Stand to Sit: Stand-by assistance        Bed to Chair: Stand-by assistance              Balance:   Sitting: Intact; Without support  Standing: Intact; With support (RW)  Standing - Static: Constant support;Good  Standing - Dynamic : Constant support; Fair  Ambulation/Gait Training:  Distance (ft): 100 Feet (ft)  Assistive Device: Walker, rolling;Gait belt  Ambulation - Level of Assistance: Supervision        Gait Abnormalities: Antalgic;Decreased step clearance  Right Side Weight Bearing: As tolerated  Left Side Weight Bearing: Full  Base of Support: Center of gravity altered;Shift to left  Stance: Right decreased  Speed/Stephanie: Slow  Step Length: Right shortened;Left shortened  Swing Pattern: Right asymmetrical         Stairs:  Number of Stairs Trained: 8 (Rail left, cane right)  Stairs - Level of Assistance: Stand-by assistance        Therapeutic Exercises:   Pt instructed and performed ankle pumps, quad sets, gluteal sets and heel slides - to be performed 3 x day per handouts in book. Reviewed Joint booklet with pt/wife - including use of ice, elevation and post op progression. Functional Measure:  Barthel Index:    Bathin  Bladder: 10  Bowels: 10  Groomin  Dressin  Feeding: 10  Mobility: 10  Stairs: 5  Toilet Use: 10  Transfer (Bed to Chair and Back): 10  Total: 80/100       The Barthel ADL Index: Guidelines  1. The index should be used as a record of what a patient does, not as a record of what a patient could do. 2. The main aim is to establish degree of independence from any help, physical or verbal, however minor and for whatever reason. 3. The need for supervision renders the patient not independent. 4. A patient's performance should be established using the best available evidence. Asking the patient, friends/relatives and nurses are the usual sources, but direct observation and common sense are also important. However direct testing is not needed. 5. Usually the patient's performance over the preceding 24-48 hours is important, but occasionally longer periods will be relevant. 6. Middle categories imply that the patient supplies over 50 per cent of the effort. 7. Use of aids to be independent is allowed. Score Interpretation (from [address] [facility] 83)    Independent   60-79 Minimally independent   40-59 Partially dependent   20-39 Very dependent   <20 Totally dependent     -Osmani Rodriguez., Barthel, D.W. (1965). Functional evaluation: the Barthel Index. [facility] ([address] (1997).  The Barthel activities of daily living index: self-reporting versus actual performance in the old (> or = 75 years). Journal of [address] [place] [address] 45(6), 14 [facility], J.CARRIE.F, Chantelle Arnold., Choco Meneses. (1999). Measuring the change in disability after inpatient rehabilitation; comparison of the responsiveness of the Barthel Index and Functional [place] Measure. Journal of Neurology, Neurosurgery, and Psychiatry, 66(4), 120-885. DARREN Garcia, NATALIA Silva, & Birdie Trevino M.A. (2004) Assessment of post-stroke quality of life in cost-effectiveness studies: The usefulness of the Barthel Index and the EuroQoL-5D. Quality of Life Research, 15, 164-35          Physical Therapy Evaluation Charge Determination   History Examination Presentation Decision-Making   LOW Complexity : Zero comorbidities / personal factors that will impact the outcome / POC LOW Complexity : 1-2 Standardized tests and measures addressing body structure, function, activity limitation and / or participation in recreation  LOW Complexity : Stable, uncomplicated  LOW Complexity : FOTO score of       Based on the above components, the patient evaluation is determined to be of the following complexity level: LOW     Pain Rating:  Right hip 3/10    Activity Tolerance:   Good - able to progress amb and perform stairs - pt cleared for discharge home      After treatment patient left in no apparent distress:   Sitting in chair, Call bell within reach and Caregiver / family present    COMMUNICATION/EDUCATION:   The patients plan of care was discussed with: Registered nurse. Fall prevention education was provided and the patient/caregiver indicated understanding.     Thank you for this referral.  Ramses Pandey PT   Time Calculation: 45 mins

## 2021-12-17 NOTE — PROGRESS NOTES
Transition of Care Plan:      * Disposition- Home with support of spouse  * [facility] and will open for service  * Orthopedic and PCP F/U  * State OBS provided        Reason for Admission:  Right hip ostearthritis                     RUR Score:      N/A               Plan for utilizing home health:        Referral placed to [id number] [facility]  PCP: First and Last name:  Patel Stanley MD     Name of Practice:    Are you a current patient: Yes/No: Yes   Approximate date of last visit: 2 weeks ago   Can you participate in a virtual visit with your PCP:                  Yes   Current Advanced Directive/Advance Care Plan: No Order  No ACP documents    Healthcare Decision Maker:   Click here to complete [address] including selection of the [address] Relationship (ie \"Primary\")           Denise Oro, spouse [phone]                  Transition of Care Plan:      * Disposition- Home with support of spouse  * Referral placed to [facility] and PCP F/U  * State OBS provided    Shar Pablo is   S/P R ELIE. Demographics, NOK and PCP verified. Patient owns his own RW. Patient reports full independence prior to surgery. Patient stated he is waiting for PT consult and hopes to go home this afternoon. Oral and Written notification given to patient and/or caregiver informing them that they are currently an Outpatient receiving care in our facility. Outpatient services include Observation Services. Care Management Interventions  PCP Verified by CM:  Yes  Transition of Care Consult (CM Consult): [address]: Yes  Discharge Durable Medical Equipment: No  Health Maintenance Reviewed: Yes  Physical Therapy Consult: Yes  Occupational Therapy Consult: Yes  Speech Therapy Consult: No  Support Systems: Spouse/Significant Other  The Plan for Transition of Care is Related to the Following Treatment Goals : Home with home health and Ortho F/U  The Patient and/or Patient Representative was Provided with a Choice of Provider and Agrees with the Discharge Plan?: Yes  Name of the Patient Representative Who was Provided with a Choice of Provider and Agrees with the Discharge Plan: Patient  Freedom of Choice List was Provided with Basic Dialogue that Supports the Patient's Individualized Plan of Care/Goals, Treatment Preferences and Shares the Quality Data Associated with the Providers?: Yes   Resource Information Provided?: No  Discharge Location  Discharge Placement: Home with home health     Greta Leary ACM-SW  Case Management [address] [place] [address]  C: [phone]

## 2021-12-17 NOTE — ROUTINE PROCESS
Primary Nurse Camryn Tse RN and Fidencio Santiago RN performed a dual skin assessment on this patient Impairment noted- see wound doc flow sheet  Alfredito score is 19    Patient has scattered psoriasis BLE and BUE; has a spot on plantar aspect of right foot.

## 2021-12-17 NOTE — PROGRESS NOTES
Nursing Discharge Note: Patient discharged to home in stable condition with all his personal belongings. His wife took him home in personal vehicle. Reviewed all discharge instructions with the patient and his wife at the bedside verifying understanding via teachback method.

## 2021-12-18 ENCOUNTER — HOME CARE VISIT (OUTPATIENT)
Dept: SCHEDULING | Facility: HOME HEALTH | Age: 56
End: 2021-12-18
Payer: COMMERCIAL

## 2021-12-18 VITALS
HEART RATE: 91 BPM | DIASTOLIC BLOOD PRESSURE: 82 MMHG | RESPIRATION RATE: 18 BRPM | OXYGEN SATURATION: 95 % | SYSTOLIC BLOOD PRESSURE: 142 MMHG | TEMPERATURE: 98.3 F

## 2021-12-18 PROCEDURE — 400013 HH SOC

## 2021-12-18 PROCEDURE — G0151 HHCP-SERV OF PT,EA 15 MIN: HCPCS

## 2021-12-20 NOTE — ANESTHESIA POSTPROCEDURE EVALUATION
Post-Anesthesia Evaluation and Assessment    Patient: Divine Jackson MRN: [id number]  SSN: [id number]    YOB: 1965  Age: 49 y.o. Sex: male      I have evaluated the patient and they are stable and ready for discharge from the PACU. Cardiovascular Function/Vital Signs  Visit Vitals  BP (!) 145/77 (BP 1 Location: Right upper arm, BP Patient Position: Sitting)   Pulse 73   Temp 36.4 °C (97.6 °F)   Resp 18   Ht 6' (1.829 m)   Wt 120 kg (264 lb 8.8 oz)   SpO2 98%   BMI 35.88 kg/m²       Patient is status post General anesthesia for Procedure(s):  RIGHT TOTAL HIP ARTHROPLASTY ANTERIOR APPROACH (PAUL) (FAST TRACK). Nausea/Vomiting: None    Postoperative hydration reviewed and adequate. Pain:  Pain Scale 1: Numeric (0 - 10) (12/17/21 1040)  Pain Intensity 1: 5 (12/17/21 1040)   Managed    Neurological Status:   Neuro (WDL): Within Defined Limits (12/16/21 0857)  Neuro  Neurologic State: Alert (12/17/21 0900)  Orientation Level: Appropriate for age;Oriented X4 (12/17/21 1151)  Cognition: Appropriate decision making; Appropriate for age attention/concentration; Appropriate safety awareness (12/17/21 1151)  LUE Motor Response: Purposeful (12/17/21 1151)  LLE Motor Response: Purposeful (12/17/21 1151)  RUE Motor Response: Purposeful (12/17/21 1151)  RLE Motor Response: Purposeful (12/17/21 1151)   At baseline    Mental Status, Level of Consciousness: Alert and  oriented to person, place, and time    Pulmonary Status:   O2 Device: None (Room air) (12/17/21 1040)   Adequate oxygenation and airway patent    Complications related to anesthesia: None    Post-anesthesia assessment completed. No concerns    Signed By: Stephanie Duran MD     December 20, 2021              Procedure(s):  RIGHT TOTAL HIP ARTHROPLASTY ANTERIOR APPROACH (PAUL) (FAST TRACK).     general    <BSHSIANPOST>    INITIAL Post-op Vital signs:   Vitals Value Taken Time   /88 12/16/21 1646   Temp 36.7 °C (98.1 °F) 12/16/21 1642 Pulse 62 12/16/21 1646   Resp 14 12/16/21 1646   SpO2 96 % 12/16/21 1646

## 2021-12-21 ENCOUNTER — HOME CARE VISIT (OUTPATIENT)
Dept: SCHEDULING | Facility: HOME HEALTH | Age: 56
End: 2021-12-21
Payer: COMMERCIAL

## 2021-12-21 ENCOUNTER — HOME CARE VISIT (OUTPATIENT)
Dept: HOME HEALTH SERVICES | Facility: HOME HEALTH | Age: 56
End: 2021-12-21
Payer: COMMERCIAL

## 2021-12-21 PROCEDURE — G0151 HHCP-SERV OF PT,EA 15 MIN: HCPCS

## 2021-12-22 ENCOUNTER — HOME CARE VISIT (OUTPATIENT)
Dept: SCHEDULING | Facility: HOME HEALTH | Age: 56
End: 2021-12-22
Payer: COMMERCIAL

## 2021-12-22 VITALS
TEMPERATURE: 98.1 F | SYSTOLIC BLOOD PRESSURE: 170 MMHG | HEART RATE: 72 BPM | OXYGEN SATURATION: 94 % | DIASTOLIC BLOOD PRESSURE: 96 MMHG

## 2021-12-22 VITALS
SYSTOLIC BLOOD PRESSURE: 152 MMHG | TEMPERATURE: 98.5 F | RESPIRATION RATE: 18 BRPM | HEART RATE: 73 BPM | OXYGEN SATURATION: 95 % | DIASTOLIC BLOOD PRESSURE: 88 MMHG

## 2021-12-22 PROCEDURE — G0151 HHCP-SERV OF PT,EA 15 MIN: HCPCS

## 2021-12-23 ENCOUNTER — HOME CARE VISIT (OUTPATIENT)
Dept: SCHEDULING | Facility: HOME HEALTH | Age: 56
End: 2021-12-23
Payer: COMMERCIAL

## 2021-12-23 PROCEDURE — G0151 HHCP-SERV OF PT,EA 15 MIN: HCPCS

## 2021-12-27 VITALS
OXYGEN SATURATION: 96 % | HEART RATE: 70 BPM | RESPIRATION RATE: 18 BRPM | TEMPERATURE: 97.6 F | SYSTOLIC BLOOD PRESSURE: 142 MMHG | DIASTOLIC BLOOD PRESSURE: 82 MMHG

## 2021-12-28 ENCOUNTER — HOME CARE VISIT (OUTPATIENT)
Dept: SCHEDULING | Facility: HOME HEALTH | Age: 56
End: 2021-12-28
Payer: COMMERCIAL

## 2021-12-28 VITALS
OXYGEN SATURATION: 98 % | SYSTOLIC BLOOD PRESSURE: 166 MMHG | DIASTOLIC BLOOD PRESSURE: 93 MMHG | HEART RATE: 88 BPM | TEMPERATURE: 98.1 F | RESPIRATION RATE: 18 BRPM

## 2021-12-28 PROCEDURE — G0151 HHCP-SERV OF PT,EA 15 MIN: HCPCS

## 2021-12-29 ENCOUNTER — HOME CARE VISIT (OUTPATIENT)
Dept: HOME HEALTH SERVICES | Facility: HOME HEALTH | Age: 56
End: 2021-12-29
Payer: COMMERCIAL

## 2022-03-19 PROBLEM — Z96.641 STATUS POST TOTAL HIP REPLACEMENT, RIGHT: Status: ACTIVE | Noted: 2021-12-16

## 2022-03-20 PROBLEM — M16.11 OSTEOARTHRITIS OF RIGHT HIP: Status: ACTIVE | Noted: 2021-12-16

## 2023-05-11 RX ORDER — METHOCARBAMOL 500 MG/1
TABLET, FILM COATED ORAL 4 TIMES DAILY PRN
COMMUNITY
Start: 2021-12-17

## 2023-05-11 RX ORDER — HYDROCHLOROTHIAZIDE 25 MG/1
25 TABLET ORAL DAILY
COMMUNITY

## 2023-05-11 RX ORDER — AMLODIPINE BESYLATE 5 MG/1
5 TABLET ORAL DAILY
COMMUNITY

## 2023-05-11 RX ORDER — CETIRIZINE HYDROCHLORIDE 10 MG/1
CAPSULE, LIQUID FILLED ORAL DAILY
COMMUNITY

## 2023-05-11 RX ORDER — ACETAMINOPHEN 325 MG/1
TABLET ORAL EVERY 4 HOURS PRN
COMMUNITY

## 2023-05-11 RX ORDER — LEUCOVORIN CALCIUM 25 MG/1
25 TABLET ORAL DAILY
COMMUNITY

## 2023-05-11 RX ORDER — DEXTROAMPHETAMINE SACCHARATE, AMPHETAMINE ASPARTATE, DEXTROAMPHETAMINE SULFATE AND AMPHETAMINE SULFATE 5; 5; 5; 5 MG/1; MG/1; MG/1; MG/1
TABLET ORAL DAILY
COMMUNITY

## (undated) DEVICE — SCRUB DRY SURG EZ SCRUB BRUSH PREOPERATIVE GRN

## (undated) DEVICE — SYR LR LCK 1ML GRAD NSAF 30ML --

## (undated) DEVICE — DRESSING,GAUZE,XEROFORM,CURAD,1"X8",ST: Brand: CURAD

## (undated) DEVICE — PADDING CAST SPEC 6INX4YD COT --

## (undated) DEVICE — SUTURE VCRL SZ 2-0 L36IN ABSRB UD L40MM CT 1/2 CIR J957H

## (undated) DEVICE — DRESSING HYDROCOLLOID BORDER 35X10 IN ALUM PRIMASEAL

## (undated) DEVICE — PREP SKN CHLRAPRP APL 26ML STR --

## (undated) DEVICE — 6619 2 PTNT ISO SYS INCISE AREA&LT;(&GT;&&LT;)&GT;P: Brand: STERI-DRAPE™ IOBAN™ 2

## (undated) DEVICE — SUTURE ETHBND EXCEL SZ 5 L30IN NONABSORBABLE GRN L40MM V-37 MB66G

## (undated) DEVICE — YANKAUER,FLEXIBLE HANDLE,REGLR CAPACITY: Brand: MEDLINE INDUSTRIES, INC.

## (undated) DEVICE — GARMENT,MEDLINE,DVT,INT,CALF,MED, GEN2: Brand: MEDLINE

## (undated) DEVICE — NEEDLE SPNL 22GA L3.5IN BLK HUB S STL REG WALL FIT STYL W/

## (undated) DEVICE — SOLUTION IRRIG 3000ML 0.9% SOD CHL USP UROMATIC PLAS CONT

## (undated) DEVICE — SUTURE VCRL SZ 2 L54IN ABSRB UD L65MM TP-1 1/2 CIR J880T

## (undated) DEVICE — 4-PORT MANIFOLD: Brand: NEPTUNE 2

## (undated) DEVICE — TOTAL JOINT - SMH: Brand: MEDLINE INDUSTRIES, INC.

## (undated) DEVICE — SOLUTION IRRIG 1000ML STRL H2O USP PLAS POUR BTL

## (undated) DEVICE — COVER,MAYO STAND,STERILE: Brand: MEDLINE

## (undated) DEVICE — ZIP 16 SURGICAL SKIN CLOSURE DEVICE: Brand: ZIP 16 SURGICAL SKIN CLOSURE DEVICE

## (undated) DEVICE — SUTURE STRATAFIX SYMMETRIC PDS + SZ 1 L18IN ABSRB VLT L48MM SXPP1A400

## (undated) DEVICE — 2108 SERIES SAGITTAL BLADE (18.6 X 0.8 X 73.8MM)